# Patient Record
Sex: FEMALE | Race: ASIAN | Employment: UNEMPLOYED | URBAN - METROPOLITAN AREA
[De-identification: names, ages, dates, MRNs, and addresses within clinical notes are randomized per-mention and may not be internally consistent; named-entity substitution may affect disease eponyms.]

---

## 2021-08-20 ENCOUNTER — OFFICE VISIT (OUTPATIENT)
Dept: FAMILY MEDICINE CLINIC | Age: 26
End: 2021-08-20
Payer: COMMERCIAL

## 2021-08-20 VITALS
HEIGHT: 62 IN | TEMPERATURE: 98.7 F | SYSTOLIC BLOOD PRESSURE: 114 MMHG | BODY MASS INDEX: 37.28 KG/M2 | DIASTOLIC BLOOD PRESSURE: 87 MMHG | WEIGHT: 202.6 LBS | HEART RATE: 96 BPM | RESPIRATION RATE: 16 BRPM | OXYGEN SATURATION: 95 %

## 2021-08-20 DIAGNOSIS — E03.9 ACQUIRED HYPOTHYROIDISM: ICD-10-CM

## 2021-08-20 DIAGNOSIS — M54.9 CHRONIC BACK PAIN, UNSPECIFIED BACK LOCATION, UNSPECIFIED BACK PAIN LATERALITY: ICD-10-CM

## 2021-08-20 DIAGNOSIS — R14.0 ABDOMINAL BLOATING: ICD-10-CM

## 2021-08-20 DIAGNOSIS — F41.9 ANXIETY: ICD-10-CM

## 2021-08-20 DIAGNOSIS — Z65.8 PSYCHOSOCIAL STRESSORS: ICD-10-CM

## 2021-08-20 DIAGNOSIS — E66.9 OBESITY (BMI 30-39.9): ICD-10-CM

## 2021-08-20 DIAGNOSIS — N92.6 IRREGULAR PERIODS: ICD-10-CM

## 2021-08-20 DIAGNOSIS — F32.A DEPRESSION, UNSPECIFIED DEPRESSION TYPE: Primary | ICD-10-CM

## 2021-08-20 DIAGNOSIS — G89.29 CHRONIC BACK PAIN, UNSPECIFIED BACK LOCATION, UNSPECIFIED BACK PAIN LATERALITY: ICD-10-CM

## 2021-08-20 PROCEDURE — 99204 OFFICE O/P NEW MOD 45 MIN: CPT | Performed by: NURSE PRACTITIONER

## 2021-08-20 RX ORDER — VENLAFAXINE 37.5 MG/1
18.75 TABLET ORAL DAILY
Qty: 7 TABLET | Refills: 0 | Status: SHIPPED | OUTPATIENT
Start: 2021-08-20 | End: 2021-09-03

## 2021-08-20 RX ORDER — LEVOTHYROXINE SODIUM 112 UG/1
112 TABLET ORAL DAILY
COMMUNITY
Start: 2021-07-20 | End: 2021-08-27 | Stop reason: SDUPTHER

## 2021-08-20 RX ORDER — VENLAFAXINE 37.5 MG/1
37.5 TABLET ORAL DAILY
COMMUNITY
Start: 2021-07-21 | End: 2021-08-20 | Stop reason: SDUPTHER

## 2021-08-20 NOTE — PROGRESS NOTES
The Jose Dodd 27 y.o. female presents today for:    Chief Complaint   Patient presents with    Establish Care    Depression    Thyroid Problem     LNMP 7/11/2021, periods still irregular; menstrual periods every other month. Started oral contraceptive 2/2021 because pt had 6 months without period  Not sexually active   Family in [address] [place] [address]  Patient getting ready to file for divorce. Patient has been  since 2014; has 1 child. Patient states having some issues with depression and anxiety. Denies suicidal plan but several months ago had some suicidal ideas. Patient has been on Effexor for several months but does not like it and would like to switch to a new medication. PAP never had one    Review of Systems   Constitutional: Negative. Negative for chills, malaise/fatigue and weight loss. HENT: Negative. Eyes: Negative. Respiratory: Negative. Negative for shortness of breath and wheezing. Cardiovascular: Negative. Negative for chest pain, palpitations, leg swelling and PND. Gastrointestinal: Negative. Negative for diarrhea, heartburn and vomiting. Genitourinary: Negative. Negative for dysuria, frequency, hematuria and urgency. Musculoskeletal: Positive for back pain (chronic back pain). Negative for joint pain, myalgias and neck pain. Skin: Negative. Negative for rash. Neurological: Positive for headaches. Negative for dizziness. Endo/Heme/Allergies: Negative. Negative for environmental allergies. Does not bruise/bleed easily. Psychiatric/Behavioral: Positive for depression and suicidal ideas. Negative for hallucinations. The patient is nervous/anxious. The patient does not have insomnia.         Health Maintenance Due   Topic Date Due    COVID-19 Vaccine (1) Never done    DTaP/Tdap/Td series (1 - Tdap) Never done    PAP AKA CERVICAL CYTOLOGY  Never done        Past Medical History:   Diagnosis Date    Depression     Hypothyroid      Visit Vitals  /87 (BP 1 Location: Left upper arm, BP Patient Position: Sitting)   Pulse 96   Temp 98.7 °F (37.1 °C) (Temporal)   Resp 16   Ht 5' 2\" (1.575 m)   Wt 202 lb 9.6 oz (91.9 kg)   SpO2 95%   BMI 37.06 kg/m²           Physical Exam  Constitutional:       Appearance: She is obese. Eyes:      Pupils: Pupils are equal, round, and reactive to light. Neck:      Vascular: No carotid bruit. Cardiovascular:      Rate and Rhythm: Normal rate and regular rhythm. Pulses: Normal pulses. Heart sounds: Normal heart sounds. No murmur heard. Pulmonary:      Effort: Pulmonary effort is normal. No respiratory distress. Breath sounds: Normal breath sounds. No wheezing. Abdominal:      Palpations: Abdomen is soft. Tenderness: There is no abdominal tenderness. Musculoskeletal:         General: No swelling. Normal range of motion. Cervical back: Normal range of motion and neck supple. Right lower leg: No edema. Left lower leg: No edema. Lymphadenopathy:      Cervical: No cervical adenopathy. Skin:     General: Skin is warm. Capillary Refill: Capillary refill takes less than 2 seconds. Neurological:      General: No focal deficit present. Mental Status: She is alert and oriented to person, place, and time. Visit Vitals  /87 (BP 1 Location: Left upper arm, BP Patient Position: Sitting)   Pulse 96   Temp 98.7 °F (37.1 °C) (Temporal)   Resp 16   Ht 5' 2\" (1.575 m)   Wt 202 lb 9.6 oz (91.9 kg)   SpO2 95%   BMI 37.06 kg/m²     ASSESSMENT and PLAN    ICD-10-CM ICD-9-CM    1. Depression, unspecified depression type  F32.9 311 REFERRAL TO PSYCHIATRY      venlafaxine (EFFEXOR) 37.5 mg tablet   2. Anxiety  F41.9 300.00 REFERRAL TO PSYCHIATRY      venlafaxine (EFFEXOR) 37.5 mg tablet   3. Psychosocial stressors  Z65.8 V62.89 REFERRAL TO PSYCHIATRY   4.  Acquired hypothyroidism  E03.9 244.9 TSH 3RD GENERATION      METABOLIC PANEL, COMPREHENSIVE      T4 (THYROXINE)      T4 (THYROXINE) METABOLIC PANEL, COMPREHENSIVE      TSH 3RD GENERATION   5. Abdominal bloating  R14.0 787.3 CELIAC ANTIBODY PROFILE      CELIAC ANTIBODY PROFILE   6. Irregular periods  N92.6 626.4    7. Obesity (BMI 30-39. 9)  E66.9 278.00 LIPID PANEL      LIPID PANEL   8. Chronic back pain, unspecified back location, unspecified back pain laterality  M54.9 724.5     G89.29 338.29      Follow-up and Dispositions    · Return in about 2 weeks (around 9/3/2021) for PAP/depression management/lab review. .       the following changes in treatment are made: will taper down Effexor medication for 2 weeks and then start a new SSRI afterwards. Advised patient any SI or plan to call 911 or go to ER-patient agrees with plan. Referral to psychiatry for counseling.   lab results and schedule of future lab studies reviewed with patient  reviewed diet, exercise and weight control  reviewed medications and side effects in detail    Susy Scott NP

## 2021-08-20 NOTE — PROGRESS NOTES
Candice Houston presents today for   Chief Complaint   Patient presents with    Establish Care    Depression    Thyroid Problem       Is someone accompanying this pt? no    Is the patient using any DME equipment during OV? no    Depression Screening:  3 most recent PHQ Screens 8/20/2021   Little interest or pleasure in doing things Several days   Feeling down, depressed, irritable, or hopeless Nearly every day   Total Score PHQ 2 4   Trouble falling or staying asleep, or sleeping too much More than half the days   Feeling tired or having little energy More than half the days   Poor appetite, weight loss, or overeating Nearly every day   Feeling bad about yourself - or that you are a failure or have let yourself or your family down Several days   Trouble concentrating on things such as school, work, reading, or watching TV Several days   Moving or speaking so slowly that other people could have noticed; or the opposite being so fidgety that others notice Not at all   Thoughts of being better off dead, or hurting yourself in some way More than half the days   PHQ 9 Score 15   How difficult have these problems made it for you to do your work, take care of your home and get along with others Somewhat difficult       Learning Assessment:  Learning Assessment 8/20/2021   PRIMARY LEARNER Patient   HIGHEST LEVEL OF EDUCATION - PRIMARY LEARNER  SOME COLLEGE   BARRIERS PRIMARY LEARNER NONE   CO-LEARNER CAREGIVER No   PRIMARY LANGUAGE ENGLISH   LEARNER PREFERENCE PRIMARY DEMONSTRATION   ANSWERED BY patient   RELATIONSHIP SELF       Health Maintenance reviewed and discussed and ordered per Provider. Health Maintenance Due   Topic Date Due    Hepatitis C Screening  Never done    COVID-19 Vaccine (1) Never done    DTaP/Tdap/Td series (1 - Tdap) Never done    PAP AKA CERVICAL CYTOLOGY  Never done   . Coordination of Care:  1. Have you been to the ER, urgent care clinic since your last visit?  Hospitalized since your last visit? no    2. Have you seen or consulted any other health care providers outside of the [address] [place] [address] since your last visit? Include any pap smears or colon screening.  Yes [facility] check[facility], last year     Health Maintenance Due   Topic Date Due    Hepatitis C Screening  Never done    COVID-19 Vaccine (1) Never done    DTaP/Tdap/Td series (1 - Tdap) Never done    PAP AKA CERVICAL CYTOLOGY  Never done

## 2021-08-21 LAB
A-G RATIO,AGRAT: 1.6 RATIO (ref 1.1–2.6)
ALBUMIN SERPL-MCNC: 4.8 G/DL (ref 3.5–5)
ALP SERPL-CCNC: 49 U/L (ref 25–115)
ALT SERPL-CCNC: 92 U/L (ref 5–40)
ANION GAP SERPL CALC-SCNC: 13 MMOL/L (ref 3–15)
AST SERPL W P-5'-P-CCNC: 59 U/L (ref 10–37)
BILIRUB SERPL-MCNC: 0.4 MG/DL (ref 0.2–1.2)
BUN SERPL-MCNC: 9 MG/DL (ref 6–22)
CALCIUM SERPL-MCNC: 10.2 MG/DL (ref 8.4–10.5)
CHLORIDE SERPL-SCNC: 102 MMOL/L (ref 98–110)
CHOLEST SERPL-MCNC: 159 MG/DL (ref 110–200)
CO2 SERPL-SCNC: 25 MMOL/L (ref 20–32)
CREAT SERPL-MCNC: 0.9 MG/DL (ref 0.5–1.2)
GFRAA, 66117: >60
GFRNA, 66118: >60
GLOBULIN,GLOB: 3 G/DL (ref 2–4)
GLUCOSE SERPL-MCNC: 98 MG/DL (ref 70–99)
HDLC SERPL-MCNC: 3.7 MG/DL (ref 0–5)
HDLC SERPL-MCNC: 43 MG/DL
LDL/HDL RATIO,LDHD: 1.6
LDLC SERPL CALC-MCNC: 70 MG/DL (ref 50–99)
NON-HDL CHOLESTEROL, 011976: 116 MG/DL
POTASSIUM SERPL-SCNC: 4.9 MMOL/L (ref 3.5–5.5)
PROT SERPL-MCNC: 7.8 G/DL (ref 6.4–8.3)
SODIUM SERPL-SCNC: 140 MMOL/L (ref 133–145)
T4 SERPL-MCNC: 13.7 MCG/DL (ref 4.5–10.9)
TRIGL SERPL-MCNC: 229 MG/DL (ref 40–149)
TSH SERPL DL<=0.005 MIU/L-ACNC: 0.07 MCU/ML (ref 0.27–4.2)
VLDLC SERPL CALC-MCNC: 46 MG/DL (ref 8–30)

## 2021-08-23 LAB
GLIADIN IGA, GIGA: 1.2 U/ML
GLIADIN IGG, GIGG: <0.5 U/ML
IMMUNOGLOBULIN A, QN, SERUM, 001784: 198 MG/DL (ref 70–400)
T-TRANSGLUTAMINASE, IGA, 164643: <0.5 U/ML
TTG IGG SER-ACNC: 2 U/ML

## 2021-08-27 ENCOUNTER — TELEPHONE (OUTPATIENT)
Dept: FAMILY MEDICINE CLINIC | Age: 26
End: 2021-08-27

## 2021-08-27 DIAGNOSIS — E78.1 HYPERTRIGLYCERIDEMIA: ICD-10-CM

## 2021-08-27 DIAGNOSIS — R79.89 ELEVATED LFTS: Primary | ICD-10-CM

## 2021-08-27 DIAGNOSIS — E03.9 ACQUIRED HYPOTHYROIDISM: ICD-10-CM

## 2021-08-27 RX ORDER — LEVOTHYROXINE SODIUM 100 UG/1
100 TABLET ORAL DAILY
Qty: 30 TABLET | Refills: 0 | Status: SHIPPED | OUTPATIENT
Start: 2021-08-27 | End: 2021-09-30 | Stop reason: SDUPTHER

## 2021-08-27 RX ORDER — FENOFIBRATE 48 MG/1
48 TABLET, COATED ORAL DAILY
Qty: 90 TABLET | Refills: 1 | Status: SHIPPED | OUTPATIENT
Start: 2021-08-27 | End: 2022-02-24 | Stop reason: SDUPTHER

## 2021-08-27 NOTE — PROGRESS NOTES
Can you please call patient to let the patient know that her labs are abnormal. Patient's celiac antibody is normal. Patient's triglycerides are elevated (pt needs to limit amount of carbohydrates and sugar); I will also call in [employer] medication for this. Pt's liver enzymes are elevated and will order a hepatitis panel and ultrasound of abdomen. Also, TSH is low and we need to adjust your Synthroid dose and decrease the medication-will send to pharmacy. Thanks!

## 2021-09-08 ENCOUNTER — HOSPITAL ENCOUNTER (OUTPATIENT)
Dept: ULTRASOUND IMAGING | Age: 26
Discharge: HOME | End: 2021-09-08
Attending: NURSE PRACTITIONER
Payer: COMMERCIAL

## 2021-09-08 DIAGNOSIS — R79.89 ELEVATED LFTS: ICD-10-CM

## 2021-09-08 PROCEDURE — 76705 ECHO EXAM OF ABDOMEN: CPT

## 2021-09-09 ENCOUNTER — OFFICE VISIT (OUTPATIENT)
Dept: FAMILY MEDICINE CLINIC | Age: 26
End: 2021-09-09
Payer: COMMERCIAL

## 2021-09-09 VITALS
DIASTOLIC BLOOD PRESSURE: 71 MMHG | RESPIRATION RATE: 16 BRPM | OXYGEN SATURATION: 97 % | HEART RATE: 70 BPM | TEMPERATURE: 98.7 F | WEIGHT: 200 LBS | SYSTOLIC BLOOD PRESSURE: 112 MMHG | BODY MASS INDEX: 36.8 KG/M2 | HEIGHT: 62 IN

## 2021-09-09 DIAGNOSIS — B37.9 YEAST INFECTION: ICD-10-CM

## 2021-09-09 DIAGNOSIS — F32.A DEPRESSION, UNSPECIFIED DEPRESSION TYPE: ICD-10-CM

## 2021-09-09 DIAGNOSIS — N93.9 ABNORMAL VAGINAL BLEEDING: ICD-10-CM

## 2021-09-09 DIAGNOSIS — Z11.3 ROUTINE SCREENING FOR STI (SEXUALLY TRANSMITTED INFECTION): ICD-10-CM

## 2021-09-09 DIAGNOSIS — Z12.4 PAP SMEAR FOR CERVICAL CANCER SCREENING: ICD-10-CM

## 2021-09-09 DIAGNOSIS — N88.9 CERVICAL LESION: ICD-10-CM

## 2021-09-09 DIAGNOSIS — Z12.39 ENCOUNTER FOR BREAST CANCER SCREENING USING NON-MAMMOGRAM MODALITY: ICD-10-CM

## 2021-09-09 DIAGNOSIS — Z12.4 SCREENING FOR MALIGNANT NEOPLASM OF CERVIX: Primary | ICD-10-CM

## 2021-09-09 PROCEDURE — 99214 OFFICE O/P EST MOD 30 MIN: CPT | Performed by: NURSE PRACTITIONER

## 2021-09-09 RX ORDER — ESCITALOPRAM OXALATE 10 MG/1
5 TABLET ORAL DAILY
Qty: 30 TABLET | Refills: 0 | Status: SHIPPED | OUTPATIENT
Start: 2021-09-09 | End: 2021-10-21 | Stop reason: SDUPTHER

## 2021-09-09 RX ORDER — LEVONORGESTREL AND ETHINYL ESTRADIOL 0.15-0.03
1 KIT ORAL DAILY
COMMUNITY
End: 2021-10-21 | Stop reason: SDUPTHER

## 2021-09-09 RX ORDER — FLUCONAZOLE 150 MG/1
150 TABLET ORAL DAILY
Qty: 1 TABLET | Refills: 0 | Status: SHIPPED | OUTPATIENT
Start: 2021-09-09 | End: 2021-09-09 | Stop reason: SINTOL

## 2021-09-09 RX ORDER — MICONAZOLE NITRATE 2 %
1 CREAM WITH APPLICATOR VAGINAL
Qty: 25 G | Refills: 0 | Status: SHIPPED | OUTPATIENT
Start: 2021-09-09 | End: 2021-09-13 | Stop reason: SDUPTHER

## 2021-09-09 NOTE — PROGRESS NOTES
Please let patient know that his ultrasound of abdomen shows a fatty liver. Patient needs to eat low fat diet.

## 2021-09-09 NOTE — PROGRESS NOTES
Patient states she will wait on the Flu vaccine. Leland Leslie presents today for   Chief Complaint   Patient presents with    Well Woman    Labs       Is someone accompanying this pt? no    Is the patient using any DME equipment during OV? no    Depression Screening:  3 most recent PHQ Screens 9/9/2021   Little interest or pleasure in doing things Several days   Feeling down, depressed, irritable, or hopeless -   Total Score PHQ 2 -   Trouble falling or staying asleep, or sleeping too much -   Feeling tired or having little energy -   Poor appetite, weight loss, or overeating -   Feeling bad about yourself - or that you are a failure or have let yourself or your family down -   Trouble concentrating on things such as school, work, reading, or watching TV -   Moving or speaking so slowly that other people could have noticed; or the opposite being so fidgety that others notice -   Thoughts of being better off dead, or hurting yourself in some way -   PHQ 9 Score -   How difficult have these problems made it for you to do your work, take care of your home and get along with others -       Learning Assessment:  Learning Assessment 8/20/2021   PRIMARY LEARNER Patient   HIGHEST LEVEL OF EDUCATION - PRIMARY LEARNER  SOME COLLEGE   BARRIERS PRIMARY LEARNER NONE   CO-LEARNER CAREGIVER No   PRIMARY LANGUAGE ENGLISH   LEARNER PREFERENCE PRIMARY DEMONSTRATION   ANSWERED BY patient   RELATIONSHIP SELF       Health Maintenance reviewed and discussed and ordered per Provider. Health Maintenance Due   Topic Date Due    DTaP/Tdap/Td series (1 - Tdap) Never done    Pap Smear  Never done    Flu Vaccine (1) Never done   . Coordination of Care:  1. Have you been to the ER, urgent care clinic since your last visit? Hospitalized since your last visit? no    2. Have you seen or consulted any other health care providers outside of the [address] [place] [address] since your last visit?  Include any pap smears or colon screening.  No    Health Maintenance Due   Topic Date Due    DTaP/Tdap/Td series (1 - Tdap) Never done    Pap Smear  Never done    Flu Vaccine (1) Never done

## 2021-09-09 NOTE — PROGRESS NOTES
The Jose Dodd 27 y.o. female presents today for:    Chief Complaint   Patient presents with    Well Woman    Labs     STD testing-patient would like to be tested  1 sexual partner in 12 months   Pt has vaginal discharge that smells like yeast for a week  G 1 P 1 A 0 L 1  No family history of GYN or breast CA  Never had a PAP   LNMP 9/1-9/6  Irregular periods   Any pain with sex  Any  symptoms   2 weeks of effexor taper      Review of Systems   Constitutional: Negative. Negative for chills, malaise/fatigue and weight loss. HENT: Negative. Eyes: Negative. Respiratory: Negative. Negative for shortness of breath and wheezing. Cardiovascular: Negative. Negative for chest pain, palpitations, leg swelling and PND. Gastrointestinal: Negative. Negative for diarrhea, heartburn and vomiting. Genitourinary: Negative. Negative for dysuria, frequency, hematuria and urgency. Musculoskeletal: Positive for back pain (chronic back pain). Negative for joint pain, myalgias and neck pain. Skin: Negative. Negative for rash. Neurological: Positive for headaches. Negative for dizziness. Endo/Heme/Allergies: Negative. Negative for environmental allergies. Does not bruise/bleed easily. Psychiatric/Behavioral: Positive for depression and suicidal ideas. Negative for hallucinations. The patient is nervous/anxious. The patient does not have insomnia. Health Maintenance Due   Topic Date Due    DTaP/Tdap/Td series (1 - Tdap) Never done    Pap Smear  Never done    Flu Vaccine (1) Never done        Past Medical History:   Diagnosis Date    Depression     Hypothyroid        Physical Exam  Exam conducted with a chaperone present. Constitutional:       Appearance: She is obese. Eyes:      Pupils: Pupils are equal, round, and reactive to light. Neck:      Vascular: No carotid bruit. Cardiovascular:      Rate and Rhythm: Normal rate and regular rhythm. Pulses: Normal pulses.       Heart sounds: Normal heart sounds. No murmur heard. Pulmonary:      Effort: Pulmonary effort is normal. No respiratory distress. Breath sounds: Normal breath sounds. No wheezing. Chest:      Breasts:         Right: Normal.         Left: Normal.   Abdominal:      Palpations: Abdomen is soft. Tenderness: There is no abdominal tenderness. Genitourinary:     General: Normal vulva. Exam position: Supine. Pubic Area: No rash or pubic lice. Labia:         Right: No rash or tenderness. Left: No rash or tenderness. Vagina: No foreign body. Vaginal discharge present. No erythema, tenderness, bleeding or lesions. Cervix: Cervical motion tenderness, friability, lesion, erythema and cervical bleeding present. Uterus: Normal.       Adnexa: Right adnexa normal.      Rectum: Normal.         Musculoskeletal:         General: No swelling. Normal range of motion. Cervical back: Normal range of motion and neck supple. Right lower leg: No edema. Left lower leg: No edema. Lymphadenopathy:      Cervical: No cervical adenopathy. Lower Body: No right inguinal adenopathy. No left inguinal adenopathy. Skin:     General: Skin is warm. Capillary Refill: Capillary refill takes less than 2 seconds. Neurological:      General: No focal deficit present. Mental Status: She is alert and oriented to person, place, and time. Visit Vitals  /71 (BP 1 Location: Right arm, BP Patient Position: Sitting)   Pulse 70   Temp 98.7 °F (37.1 °C) (Temporal)   Resp 16   Ht 5' 2\" (1.575 m)   Wt 200 lb (90.7 kg)   SpO2 97%   BMI 36.58 kg/m²         ASSESSMENT and PLAN    ICD-10-CM ICD-9-CM    1. Screening for malignant neoplasm of cervix  Z12.4 V76.2 PAP IG, APTIMA HPV AND RFX 16/18,45 ([id number])      PAP IG, APTIMA HPV AND RFX 16/18,45 ([id number])   2. Encounter for breast cancer screening using non-mammogram modality  Z12.39 V76.10    3.  Pap smear for cervical cancer screening  Z12.4 V76.2    4. Yeast infection  B37.9 112.9 DISCONTINUED: fluconazole (DIFLUCAN) 150 mg tablet      DISCONTINUED: miconazole (MICOTIN) 2 % vaginal cream   5. Routine screening for STI (sexually transmitted infection)  Z11.3 V74.5 NUSWAB VG PLUS+MYCOPLASMAS,HEMAL      NUSWAB VG PLUS+MYCOPLASMAS,HEMAL   6. Abnormal vaginal bleeding  N93.9 623.8 REFERRAL TO OBSTETRICS AND GYNECOLOGY   7. Cervical lesion  N88.9 622.9 REFERRAL TO OBSTETRICS AND GYNECOLOGY   8. Depression, unspecified depression type  F32.9 311 escitalopram oxalate (LEXAPRO) 10 mg tablet     Follow-up and Dispositions    · Return in about 1 month (around 10/9/2021), or if symptoms worsen or fail to improve. Finished antidepressant taper, Lexapro ordered.  OB/GYN referral.  Yvrose Quinteros NP

## 2021-09-13 DIAGNOSIS — B37.9 YEAST INFECTION: ICD-10-CM

## 2021-09-13 LAB
BACTERIAL VAGINOSIS, NAA: POSITIVE
CANDIDA GLABRATA, NAA, 180057: NEGATIVE
CANDIDA SPECIES, NAA: NEGATIVE
CHLAMYDIA TRACHOMATIS, NAA, 180097: NEGATIVE
HPV HIGH RISK, 507303: POSITIVE
NEISSERIA GONORRHOEAE, NAA, 180104: NEGATIVE
TRICH VAG BY NAA, 180087: NEGATIVE

## 2021-09-13 RX ORDER — MICONAZOLE NITRATE 2 %
1 CREAM WITH APPLICATOR VAGINAL
Qty: 25 G | Refills: 0 | Status: SHIPPED | OUTPATIENT
Start: 2021-09-13 | End: 2021-09-18

## 2021-09-14 ENCOUNTER — TELEPHONE (OUTPATIENT)
Dept: FAMILY MEDICINE CLINIC | Age: 26
End: 2021-09-14

## 2021-09-14 DIAGNOSIS — B96.89 BACTERIAL VAGINOSIS: Primary | ICD-10-CM

## 2021-09-14 DIAGNOSIS — N76.0 BACTERIAL VAGINOSIS: Primary | ICD-10-CM

## 2021-09-14 RX ORDER — METRONIDAZOLE 500 MG/1
500 TABLET ORAL 2 TIMES DAILY
Qty: 20 TABLET | Refills: 0 | Status: SHIPPED | OUTPATIENT
Start: 2021-09-14 | End: 2021-09-24

## 2021-09-14 NOTE — PROGRESS NOTES
Pt's VG plus positive for bacterial vaginosis, flagyl Rx sent to pharmacy. Pt already referred to OB/GYN at last visit for cervical lesion and now PAP is positive for HPV. Pt requires further testing as this is her first PAP.

## 2021-09-15 LAB — PAP IMAGE GUIDED, 8900296: NORMAL

## 2021-09-16 LAB
MISCELLANEOUS TEST,99000: NORMAL
MYCOPLASMA GENITALIUM, SWAB: NEGATIVE
SENT TO, 434: NORMAL
TEST NAME, 435: NORMAL

## 2021-09-30 DIAGNOSIS — E03.9 ACQUIRED HYPOTHYROIDISM: ICD-10-CM

## 2021-09-30 RX ORDER — LEVOTHYROXINE SODIUM 100 UG/1
100 TABLET ORAL DAILY
Qty: 30 TABLET | Refills: 0 | Status: SHIPPED | OUTPATIENT
Start: 2021-09-30 | End: 2021-10-21 | Stop reason: SDUPTHER

## 2021-10-21 ENCOUNTER — OFFICE VISIT (OUTPATIENT)
Dept: FAMILY MEDICINE CLINIC | Age: 26
End: 2021-10-21
Payer: COMMERCIAL

## 2021-10-21 VITALS
HEIGHT: 62 IN | WEIGHT: 191.2 LBS | HEART RATE: 78 BPM | SYSTOLIC BLOOD PRESSURE: 104 MMHG | DIASTOLIC BLOOD PRESSURE: 71 MMHG | BODY MASS INDEX: 35.19 KG/M2 | OXYGEN SATURATION: 98 % | RESPIRATION RATE: 16 BRPM | TEMPERATURE: 98.3 F

## 2021-10-21 DIAGNOSIS — Z63.5 FAMILY DISRUPTION DUE TO DIVORCE OR LEGAL SEPARATION: ICD-10-CM

## 2021-10-21 DIAGNOSIS — E03.9 ACQUIRED HYPOTHYROIDISM: ICD-10-CM

## 2021-10-21 DIAGNOSIS — F32.A DEPRESSION, UNSPECIFIED DEPRESSION TYPE: ICD-10-CM

## 2021-10-21 DIAGNOSIS — N93.9 ABNORMAL VAGINAL BLEEDING: Primary | ICD-10-CM

## 2021-10-21 DIAGNOSIS — F41.9 ANXIETY: ICD-10-CM

## 2021-10-21 PROCEDURE — 99214 OFFICE O/P EST MOD 30 MIN: CPT | Performed by: NURSE PRACTITIONER

## 2021-10-21 RX ORDER — LEVONORGESTREL AND ETHINYL ESTRADIOL 0.15-0.03
1 KIT ORAL DAILY
Qty: 1 DOSE PACK | Refills: 3 | Status: SHIPPED | OUTPATIENT
Start: 2021-10-21 | End: 2022-01-18 | Stop reason: SDUPTHER

## 2021-10-21 RX ORDER — LEVOTHYROXINE SODIUM 100 UG/1
100 TABLET ORAL DAILY
Qty: 30 TABLET | Refills: 0 | Status: SHIPPED | OUTPATIENT
Start: 2021-10-21 | End: 2021-10-21 | Stop reason: ALTCHOICE

## 2021-10-21 RX ORDER — ESCITALOPRAM OXALATE 10 MG/1
10 TABLET ORAL DAILY
Qty: 30 TABLET | Refills: 2 | Status: SHIPPED | OUTPATIENT
Start: 2021-10-21 | End: 2021-11-03 | Stop reason: SDUPTHER

## 2021-10-21 SDOH — SOCIAL STABILITY - SOCIAL INSECURITY: DISRUPTION OF FAMILY BY SEPARATION AND DIVORCE: Z63.5

## 2021-10-21 NOTE — PROGRESS NOTES
The Jose Dodd 27 y.o. female presents today for:    Chief Complaint   Patient presents with    Follow-up    Depression   [address] NP present with pt's permission. LNMP 10/11/2021    Patient did not tolerate the Effexor and tapered off. Denies SI/HI. The past 2 weeks have been better without medication. Still with family/separation stress. Denies increase in headaches. Review of Systems   Constitutional: Negative. Negative for chills, malaise/fatigue and weight loss. HENT: Negative. Eyes: Negative. Respiratory: Negative. Negative for shortness of breath and wheezing. Cardiovascular: Negative. Negative for chest pain, palpitations, leg swelling and PND. Gastrointestinal: Negative. Negative for diarrhea, heartburn and vomiting. Genitourinary: Negative. Negative for dysuria, frequency, hematuria and urgency. Musculoskeletal: Positive for back pain (chronic back pain). Negative for joint pain, myalgias and neck pain. Skin: Negative. Negative for rash. Neurological: Negative for dizziness and headaches. Endo/Heme/Allergies: Negative. Negative for environmental allergies. Does not bruise/bleed easily. Psychiatric/Behavioral: Positive for depression. Negative for hallucinations and suicidal ideas. The patient is nervous/anxious. The patient does not have insomnia. Health Maintenance Due   Topic Date Due    DTaP/Tdap/Td series (1 - Tdap) Never done    Flu Vaccine (1) Never done        Past Medical History:   Diagnosis Date    Depression     Hypothyroid        Physical Exam  Constitutional:       Appearance: She is obese. Eyes:      Pupils: Pupils are equal, round, and reactive to light. Neck:      Vascular: No carotid bruit. Cardiovascular:      Rate and Rhythm: Normal rate and regular rhythm. Pulses: Normal pulses. Heart sounds: Normal heart sounds. No murmur heard.      Pulmonary:      Effort: Pulmonary effort is normal. No respiratory distress. Breath sounds: Normal breath sounds. No wheezing. Chest:      Breasts:         Right: Normal.         Left: Normal.   Abdominal:      Palpations: Abdomen is soft. Tenderness: There is no abdominal tenderness. Genitourinary:        Musculoskeletal:         General: No swelling. Normal range of motion. Cervical back: Normal range of motion and neck supple. Right lower leg: No edema. Left lower leg: No edema. Lymphadenopathy:      Cervical: No cervical adenopathy. Skin:     General: Skin is warm. Capillary Refill: Capillary refill takes less than 2 seconds. Neurological:      General: No focal deficit present. Mental Status: She is alert and oriented to person, place, and time. Visit Vitals  /71 (BP 1 Location: Left upper arm, BP Patient Position: Sitting)   Pulse 78   Temp 98.3 °F (36.8 °C) (Temporal)   Resp 16   Ht 5' 2\" (1.575 m)   Wt 191 lb 3.2 oz (86.7 kg)   SpO2 98%   BMI 34.97 kg/m²          ASSESSMENT and PLAN    ICD-10-CM ICD-9-CM    1. Abnormal vaginal bleeding  N93.9 623.8 levonorgestrel-ethinyl estradiol (NORDETTE) 0.15-0.03 mg tab   2. Acquired hypothyroidism  E03.9 244.9 TSH 3RD GENERATION      DISCONTINUED: levothyroxine (SYNTHROID) 100 mcg tablet   3. Depression, unspecified depression type  F32. A 311 escitalopram oxalate (LEXAPRO) 10 mg tablet      REFERRAL TO PSYCHIATRY   4. Anxiety  F41.9 300.00 REFERRAL TO PSYCHIATRY   5.  Family disruption due to divorce or legal separation  Z63.5 V61.03 REFERRAL TO PSYCHIATRY         lab results and schedule of future lab studies reviewed with patient  reviewed diet, exercise and weight control  cardiovascular risk and specific lipid/LDL goals reviewed  reviewed medications and side effects in detail    Guru Perez NP

## 2021-10-21 NOTE — PROGRESS NOTES
Patient went to see the OB/GYN on October 12, 2021. Patient declined Flu vaccine declined. Moses Piper presents today for   Chief Complaint   Patient presents with    Follow-up    Depression    Labs       Is someone accompanying this pt? no    Is the patient using any DME equipment during OV? no    Depression Screening:  3 most recent PHQ Screens 10/21/2021   Little interest or pleasure in doing things Nearly every day   Feeling down, depressed, irritable, or hopeless Nearly every day   Total Score PHQ 2 6   Trouble falling or staying asleep, or sleeping too much Not at all   Feeling tired or having little energy More than half the days   Poor appetite, weight loss, or overeating More than half the days   Feeling bad about yourself - or that you are a failure or have let yourself or your family down More than half the days   Trouble concentrating on things such as school, work, reading, or watching TV Several days   Moving or speaking so slowly that other people could have noticed; or the opposite being so fidgety that others notice Not at all   Thoughts of being better off dead, or hurting yourself in some way Not at all   PHQ 9 Score 13   How difficult have these problems made it for you to do your work, take care of your home and get along with others Somewhat difficult       Learning Assessment:  Learning Assessment 8/20/2021   PRIMARY LEARNER Patient   HIGHEST LEVEL OF EDUCATION - PRIMARY LEARNER  SOME COLLEGE   BARRIERS PRIMARY LEARNER NONE   CO-LEARNER CAREGIVER No   PRIMARY LANGUAGE ENGLISH   LEARNER PREFERENCE PRIMARY DEMONSTRATION   ANSWERED BY patient   RELATIONSHIP SELF       Health Maintenance reviewed and discussed and ordered per Provider. Health Maintenance Due   Topic Date Due    Flu Vaccine (1) Never done   . Coordination of Care:  1. Have you been to the ER, urgent care clinic since your last visit? Hospitalized since your last visit? no    2.  Have you seen or consulted any other health care providers outside of the [address] [place] [address] since your last visit? Include any pap smears or colon screening.  No    Health Maintenance Due   Topic Date Due    Flu Vaccine (1) Never done

## 2021-11-03 DIAGNOSIS — F32.A DEPRESSION, UNSPECIFIED DEPRESSION TYPE: ICD-10-CM

## 2021-11-03 RX ORDER — ESCITALOPRAM OXALATE 10 MG/1
10 TABLET ORAL DAILY
Qty: 30 TABLET | Refills: 2 | Status: SHIPPED | OUTPATIENT
Start: 2021-11-03 | End: 2022-01-18 | Stop reason: SDUPTHER

## 2021-11-03 NOTE — TELEPHONE ENCOUNTER
Fax received from pharmacy states patient takes a half of a tablet daily. EMR say 1 tablet daily and requesting 90 day supply.

## 2021-12-02 ENCOUNTER — LAB ONLY (OUTPATIENT)
Dept: FAMILY MEDICINE CLINIC | Age: 26
End: 2021-12-02

## 2021-12-02 ENCOUNTER — TELEPHONE (OUTPATIENT)
Dept: FAMILY MEDICINE CLINIC | Age: 26
End: 2021-12-02

## 2021-12-02 DIAGNOSIS — E03.9 ACQUIRED HYPOTHYROIDISM: Primary | ICD-10-CM

## 2021-12-02 DIAGNOSIS — E03.9 ACQUIRED HYPOTHYROIDISM: ICD-10-CM

## 2021-12-02 DIAGNOSIS — R79.89 ELEVATED LFTS: ICD-10-CM

## 2021-12-03 LAB
HCV AB SER IA-ACNC: NORMAL
HEP A AB,IGM, 006734: NORMAL
HEP B CORE AB,IGM, 016881: NORMAL
HEP B SURFACE AG SCRN, 006510: NORMAL
TSH SERPL DL<=0.005 MIU/L-ACNC: 2.06 MCU/ML (ref 0.27–4.2)

## 2021-12-27 PROBLEM — F32.A DEPRESSIVE DISORDER: Status: ACTIVE | Noted: 2021-10-12

## 2021-12-27 PROBLEM — F41.9 ANXIETY: Status: ACTIVE | Noted: 2021-10-12

## 2021-12-27 PROBLEM — E03.9 HYPOTHYROIDISM: Status: ACTIVE | Noted: 2021-10-12

## 2021-12-27 PROBLEM — E78.00 HYPERCHOLESTEROLEMIA: Status: ACTIVE | Noted: 2021-10-12

## 2021-12-27 RX ORDER — LEVOTHYROXINE SODIUM 100 UG/1
100 TABLET ORAL
Qty: 30 TABLET | Refills: 0 | Status: SHIPPED | OUTPATIENT
Start: 2021-12-27 | End: 2022-01-18 | Stop reason: SDUPTHER

## 2021-12-27 NOTE — TELEPHONE ENCOUNTER
Pt has been off three weeks synthroid due to miscommunication. This NP reiterated to take medication for 30 days and schedule 1 month TSH lab. Pt agrees with plan and will  medication today. Pt complains of fatigue and hair loss since stopping.

## 2021-12-27 NOTE — TELEPHONE ENCOUNTER
Spoke to patient to inform her about her medication. Patient states she would like to go to a Endocrinologist to see what is going on and also she has finished her Synothroid medication. Patient was inform that a message will be sent to ROHITH Worrell and I will give her a call back if not today then tomorrow about what she will need to do concerning medication. Patient verbalized understanding.

## 2021-12-27 NOTE — TELEPHONE ENCOUNTER
Patient states she was informed to stop her synthroid medication. Result note was sent to [facility] who called patient to let her know levels were good. Pt has not been taking her levothyroxine; unsure for how long. This NP called and left voicemail for patient to call back regarding medication as pt should never stop taking synthroid medication.

## 2022-01-18 DIAGNOSIS — N93.9 ABNORMAL VAGINAL BLEEDING: ICD-10-CM

## 2022-01-18 DIAGNOSIS — F32.A DEPRESSION, UNSPECIFIED DEPRESSION TYPE: ICD-10-CM

## 2022-01-18 RX ORDER — LEVONORGESTREL AND ETHINYL ESTRADIOL 0.15-0.03
1 KIT ORAL DAILY
Qty: 1 DOSE PACK | Refills: 3 | Status: SHIPPED | OUTPATIENT
Start: 2022-01-18 | End: 2022-04-11 | Stop reason: SDUPTHER

## 2022-01-18 RX ORDER — ESCITALOPRAM OXALATE 10 MG/1
10 TABLET ORAL DAILY
Qty: 30 TABLET | Refills: 2 | Status: SHIPPED | OUTPATIENT
Start: 2022-01-18 | End: 2022-02-24 | Stop reason: SDUPTHER

## 2022-01-18 RX ORDER — LEVOTHYROXINE SODIUM 100 UG/1
100 TABLET ORAL
Qty: 30 TABLET | Refills: 1 | Status: SHIPPED | OUTPATIENT
Start: 2022-01-18 | End: 2022-02-14 | Stop reason: SDUPTHER

## 2022-01-19 ENCOUNTER — LAB ONLY (OUTPATIENT)
Dept: FAMILY MEDICINE CLINIC | Age: 27
End: 2022-01-19

## 2022-01-19 DIAGNOSIS — E03.9 ACQUIRED HYPOTHYROIDISM: ICD-10-CM

## 2022-01-20 LAB — TSH SERPL DL<=0.005 MIU/L-ACNC: 2.24 MCU/ML (ref 0.27–4.2)

## 2022-01-21 NOTE — PROGRESS NOTES
Can you please call patient to let the patient know that her TSH is normal so continue same dose of Synthroid. Thanks!

## 2022-01-28 NOTE — PROGRESS NOTES
1st attempt to call patient to inform of labs, no answer; left voicemail for patient to call office back.

## 2022-02-14 RX ORDER — LEVOTHYROXINE SODIUM 100 UG/1
100 TABLET ORAL
Qty: 30 TABLET | Refills: 1 | Status: SHIPPED | OUTPATIENT
Start: 2022-02-14 | End: 2022-02-24 | Stop reason: SDUPTHER

## 2022-02-24 ENCOUNTER — OFFICE VISIT (OUTPATIENT)
Dept: FAMILY MEDICINE CLINIC | Age: 27
End: 2022-02-24
Payer: COMMERCIAL

## 2022-02-24 VITALS
WEIGHT: 199.4 LBS | HEART RATE: 91 BPM | HEIGHT: 62 IN | DIASTOLIC BLOOD PRESSURE: 68 MMHG | TEMPERATURE: 98.4 F | RESPIRATION RATE: 16 BRPM | BODY MASS INDEX: 36.7 KG/M2 | SYSTOLIC BLOOD PRESSURE: 107 MMHG | OXYGEN SATURATION: 99 %

## 2022-02-24 DIAGNOSIS — Z23 ENCOUNTER FOR IMMUNIZATION: Primary | ICD-10-CM

## 2022-02-24 DIAGNOSIS — F32.A DEPRESSION, UNSPECIFIED DEPRESSION TYPE: ICD-10-CM

## 2022-02-24 DIAGNOSIS — E03.9 ACQUIRED HYPOTHYROIDISM: ICD-10-CM

## 2022-02-24 DIAGNOSIS — R79.89 ELEVATED LFTS: ICD-10-CM

## 2022-02-24 DIAGNOSIS — L91.8 MULTIPLE ACQUIRED SKIN TAGS: ICD-10-CM

## 2022-02-24 DIAGNOSIS — E78.1 HYPERTRIGLYCERIDEMIA: ICD-10-CM

## 2022-02-24 PROCEDURE — 90471 IMMUNIZATION ADMIN: CPT | Performed by: NURSE PRACTITIONER

## 2022-02-24 PROCEDURE — 90686 IIV4 VACC NO PRSV 0.5 ML IM: CPT | Performed by: NURSE PRACTITIONER

## 2022-02-24 PROCEDURE — 99214 OFFICE O/P EST MOD 30 MIN: CPT | Performed by: NURSE PRACTITIONER

## 2022-02-24 RX ORDER — LEVOTHYROXINE SODIUM 100 UG/1
100 TABLET ORAL
Qty: 90 TABLET | Refills: 1 | Status: SHIPPED | OUTPATIENT
Start: 2022-02-24 | End: 2022-09-21 | Stop reason: SDUPTHER

## 2022-02-24 RX ORDER — ESCITALOPRAM OXALATE 10 MG/1
10 TABLET ORAL DAILY
Qty: 90 TABLET | Refills: 1 | Status: SHIPPED | OUTPATIENT
Start: 2022-02-24 | End: 2022-09-21 | Stop reason: SDUPTHER

## 2022-02-24 RX ORDER — FENOFIBRATE 48 MG/1
48 TABLET, COATED ORAL DAILY
Qty: 90 TABLET | Refills: 1 | Status: SHIPPED | OUTPATIENT
Start: 2022-02-24 | End: 2022-08-11 | Stop reason: SDUPTHER

## 2022-02-24 NOTE — PROGRESS NOTES
The Jose Dodd 27 y.o. female presents today for:    Chief Complaint   Patient presents with    Follow-up    Thyroid Problem    Skin Problem     tags     TSH normal     Patient requesting skin tag removal. Pt has several to upper chest.      Review of Systems   Constitutional: Negative. Negative for chills, malaise/fatigue and weight loss. HENT: Negative. Eyes: Negative. Respiratory: Negative. Negative for shortness of breath and wheezing. Cardiovascular: Negative. Negative for chest pain, palpitations, leg swelling and PND. Gastrointestinal: Negative. Negative for diarrhea, heartburn and vomiting. Genitourinary: Negative. Negative for dysuria, frequency, hematuria and urgency. Musculoskeletal: Positive for back pain (chronic back pain). Negative for joint pain, myalgias and neck pain. Skin: Negative. Negative for rash. Neurological: Negative for dizziness and headaches. Endo/Heme/Allergies: Negative. Negative for environmental allergies. Does not bruise/bleed easily. Psychiatric/Behavioral: Negative for depression, hallucinations and suicidal ideas. The patient is nervous/anxious. The patient does not have insomnia. Health Maintenance Due   Topic Date Due    DTaP/Tdap/Td series (1 - Tdap) Never done    COVID-19 Vaccine (3 - Booster for Moderna series) 10/15/2021        Past Medical History:   Diagnosis Date    Depression     Hypothyroid        Physical Exam  Constitutional:       General: She is not in acute distress. Appearance: She is obese. She is not toxic-appearing. Neck:      Vascular: No carotid bruit. Cardiovascular:      Rate and Rhythm: Normal rate and regular rhythm. Pulses: Normal pulses. Heart sounds: No murmur heard. Pulmonary:      Effort: No respiratory distress. Breath sounds: Normal breath sounds. No wheezing. Musculoskeletal:      Cervical back: Normal range of motion.    Lymphadenopathy:      Cervical: No cervical adenopathy. Skin:     General: Skin is warm. Capillary Refill: Capillary refill takes less than 2 seconds. Neurological:      General: No focal deficit present. Mental Status: She is alert and oriented to person, place, and time. Psychiatric:         Thought Content: Thought content does not include homicidal or suicidal ideation. Thought content does not include homicidal or suicidal plan. Visit Vitals  /68 (BP 1 Location: Left upper arm, BP Patient Position: Sitting)   Pulse 91   Temp 98.4 °F (36.9 °C) (Temporal)   Resp 16   Ht 5' 2\" (1.575 m)   Wt 199 lb 6.4 oz (90.4 kg)   SpO2 99%   BMI 36.47 kg/m²       Current Outpatient Medications:     fenofibrate nanocrystallized (TRICOR) 48 mg tablet, Take 1 Tablet by mouth daily. Indications: high amount of triglyceride in the blood, Disp: 90 Tablet, Rfl: 1    levothyroxine (SYNTHROID) 100 mcg tablet, Take 1 Tablet by mouth Daily (before breakfast). , Disp: 90 Tablet, Rfl: 1    escitalopram oxalate (LEXAPRO) 10 mg tablet, Take 1 Tablet by mouth daily. , Disp: 90 Tablet, Rfl: 1    levonorgestrel-ethinyl estradiol (NORDETTE) 0.15-0.03 mg tab, Take 1 Tablet by mouth daily. , Disp: 1 Dose Pack, Rfl: 3    ASSESSMENT and PLAN    ICD-10-CM ICD-9-CM    1. Encounter for immunization  Z23 V03.89 INFLUENZA VIRUS VAC QUAD,SPLIT,PRESV FREE SYRINGE IM      THER/PROPH/DIAG INJECTION, SUBCUT/IM   2. Hypertriglyceridemia  E78.1 272.1 fenofibrate nanocrystallized (TRICOR) 48 mg tablet      LIPID PANEL   3. Depression, unspecified depression type  F32. A 311 escitalopram oxalate (LEXAPRO) 10 mg tablet   4. Acquired hypothyroidism  E03.9 244.9 levothyroxine (SYNTHROID) 100 mcg tablet   5. Multiple acquired skin tags  L91.8 701.9 REFERRAL TO DERMATOLOGY   6. Elevated LFTs  T31.68 855.6 METABOLIC PANEL, COMPREHENSIVE     Follow-up and Dispositions    · Return in about 3 months (around 5/24/2022).        lab results and schedule of future lab studies reviewed with patient  reviewed diet, exercise and weight control  cardiovascular risk and specific lipid/LDL goals reviewed  reviewed medications and side effects in detail    Jesus Nieves NP

## 2022-02-24 NOTE — PROGRESS NOTES
Patient do not have the covid booster. Zackary Cheema presents today for   Chief Complaint   Patient presents with    Follow-up    Thyroid Problem    Skin Problem     tags       Is someone accompanying this pt? no    Is the patient using any DME equipment during OV? no    Depression Screening:  3 most recent PHQ Screens 2/24/2022   Little interest or pleasure in doing things Several days   Feeling down, depressed, irritable, or hopeless Several days   Total Score PHQ 2 2   Trouble falling or staying asleep, or sleeping too much -   Feeling tired or having little energy -   Poor appetite, weight loss, or overeating -   Feeling bad about yourself - or that you are a failure or have let yourself or your family down -   Trouble concentrating on things such as school, work, reading, or watching TV -   Moving or speaking so slowly that other people could have noticed; or the opposite being so fidgety that others notice -   Thoughts of being better off dead, or hurting yourself in some way -   PHQ 9 Score -   How difficult have these problems made it for you to do your work, take care of your home and get along with others -       Learning Assessment:  Learning Assessment 8/20/2021   PRIMARY LEARNER Patient   HIGHEST LEVEL OF EDUCATION - PRIMARY LEARNER  SOME COLLEGE   BARRIERS PRIMARY LEARNER NONE   CO-LEARNER CAREGIVER No   PRIMARY LANGUAGE ENGLISH   LEARNER PREFERENCE PRIMARY DEMONSTRATION   ANSWERED BY patient   RELATIONSHIP SELF       Health Maintenance reviewed and discussed and ordered per Provider. Health Maintenance Due   Topic Date Due    DTaP/Tdap/Td series (1 - Tdap) Never done    Flu Vaccine (1) Never done    COVID-19 Vaccine (3 - Booster for Moderna series) 10/15/2021   . Coordination of Care:  1. Have you been to the ER, urgent care clinic since your last visit? Hospitalized since your last visit? no    2.  Have you seen or consulted any other health care providers outside of the [facility] since your last visit? Include any pap smears or colon screening. No        3. For patients aged 39-70: Has the patient had a colonoscopy / FIT/ Cologuard? NA - based on age      If the patient is female:    4. For patients aged 41-77: Has the patient had a mammogram within the past 2 years? NA - based on age or sex      11. For patients aged 21-65: Has the patient had a pap smear? Yes - no Care Gap present      Patient was given VIS for review, consent was obtained and per orders of NP. Leila Santoro , injection of Flu vaccine  given by [facility] CMA. Patient observed. No signs nor symptoms of any adverse reactions. Patient tolerated injection well.

## 2022-03-18 PROBLEM — E03.9 HYPOTHYROIDISM: Status: ACTIVE | Noted: 2021-10-12

## 2022-03-19 PROBLEM — E78.00 HYPERCHOLESTEROLEMIA: Status: ACTIVE | Noted: 2021-10-12

## 2022-03-19 PROBLEM — F41.9 ANXIETY: Status: ACTIVE | Noted: 2021-10-12

## 2022-03-20 PROBLEM — F32.A DEPRESSIVE DISORDER: Status: ACTIVE | Noted: 2021-10-12

## 2022-04-11 DIAGNOSIS — N93.9 ABNORMAL VAGINAL BLEEDING: ICD-10-CM

## 2022-04-11 RX ORDER — LEVONORGESTREL AND ETHINYL ESTRADIOL 0.15-0.03
1 KIT ORAL DAILY
Qty: 1 DOSE PACK | Refills: 3 | Status: SHIPPED | OUTPATIENT
Start: 2022-04-11 | End: 2022-08-10 | Stop reason: SDUPTHER

## 2022-04-21 DIAGNOSIS — Z91.09 ENVIRONMENTAL ALLERGIES: Primary | ICD-10-CM

## 2022-04-21 RX ORDER — GUAIFENESIN 600 MG/1
600 TABLET, EXTENDED RELEASE ORAL 2 TIMES DAILY
Qty: 60 TABLET | Refills: 1 | Status: SHIPPED | OUTPATIENT
Start: 2022-04-21 | End: 2022-09-27 | Stop reason: ALTCHOICE

## 2022-04-21 RX ORDER — CETIRIZINE HCL 10 MG
10 TABLET ORAL DAILY
Qty: 90 TABLET | Refills: 1 | Status: SHIPPED | OUTPATIENT
Start: 2022-04-21 | End: 2022-09-27 | Stop reason: ALTCHOICE

## 2022-04-21 NOTE — PROGRESS NOTES
Patient calling with chronic cough, denies SOB or wheezing. No fever. Complains of itching eyes, dry cough.

## 2022-08-10 DIAGNOSIS — N93.9 ABNORMAL VAGINAL BLEEDING: ICD-10-CM

## 2022-08-10 RX ORDER — LEVONORGESTREL AND ETHINYL ESTRADIOL 0.15-0.03
1 KIT ORAL DAILY
Qty: 1 DOSE PACK | Refills: 3 | Status: SHIPPED | OUTPATIENT
Start: 2022-08-10 | End: 2022-08-11 | Stop reason: SDUPTHER

## 2022-08-10 NOTE — TELEPHONE ENCOUNTER
----- Message from Juan Crook sent at 8/9/2022  4:39 PM EDT -----  Subject: Message to Provider    QUESTIONS  Information for Provider? Patient is asking if her birth control could be   called in to her pharmacy. Please reach out in this regards   ---------------------------------------------------------------------------  --------------  [address] [employer]  [phone]; OK to leave message on voicemail  ---------------------------------------------------------------------------  --------------  SCRIPT ANSWERS  Relationship to Patient?  Self

## 2022-08-11 DIAGNOSIS — E78.1 HYPERTRIGLYCERIDEMIA: ICD-10-CM

## 2022-08-11 DIAGNOSIS — N93.9 ABNORMAL VAGINAL BLEEDING: ICD-10-CM

## 2022-08-11 RX ORDER — LEVONORGESTREL AND ETHINYL ESTRADIOL 0.15-0.03
1 KIT ORAL DAILY
Qty: 1 DOSE PACK | Refills: 3 | Status: SHIPPED | OUTPATIENT
Start: 2022-08-11

## 2022-08-11 RX ORDER — FENOFIBRATE 48 MG/1
48 TABLET, COATED ORAL DAILY
Qty: 90 TABLET | Refills: 1 | Status: SHIPPED | OUTPATIENT
Start: 2022-08-11

## 2022-09-21 DIAGNOSIS — F32.A DEPRESSION, UNSPECIFIED DEPRESSION TYPE: ICD-10-CM

## 2022-09-21 DIAGNOSIS — E03.9 ACQUIRED HYPOTHYROIDISM: ICD-10-CM

## 2022-09-23 RX ORDER — LEVOTHYROXINE SODIUM 100 UG/1
100 TABLET ORAL
Qty: 90 TABLET | Refills: 1 | Status: SHIPPED | OUTPATIENT
Start: 2022-09-23 | End: 2022-09-27 | Stop reason: SDUPTHER

## 2022-09-23 RX ORDER — ESCITALOPRAM OXALATE 10 MG/1
10 TABLET ORAL DAILY
Qty: 90 TABLET | Refills: 1 | Status: SHIPPED | OUTPATIENT
Start: 2022-09-23 | End: 2022-09-27 | Stop reason: SDUPTHER

## 2022-09-27 ENCOUNTER — OFFICE VISIT (OUTPATIENT)
Dept: FAMILY MEDICINE CLINIC | Age: 27
End: 2022-09-27
Payer: COMMERCIAL

## 2022-09-27 VITALS
HEIGHT: 62 IN | TEMPERATURE: 98.2 F | OXYGEN SATURATION: 99 % | DIASTOLIC BLOOD PRESSURE: 68 MMHG | SYSTOLIC BLOOD PRESSURE: 106 MMHG | HEART RATE: 65 BPM | RESPIRATION RATE: 16 BRPM | WEIGHT: 203.2 LBS | BODY MASS INDEX: 37.39 KG/M2

## 2022-09-27 DIAGNOSIS — E78.1 HYPERTRIGLYCERIDEMIA: ICD-10-CM

## 2022-09-27 DIAGNOSIS — E03.9 ACQUIRED HYPOTHYROIDISM: ICD-10-CM

## 2022-09-27 DIAGNOSIS — F41.9 ANXIETY: ICD-10-CM

## 2022-09-27 DIAGNOSIS — E66.01 SEVERE OBESITY (BMI 35.0-39.9) WITH COMORBIDITY (HCC): ICD-10-CM

## 2022-09-27 DIAGNOSIS — R79.89 ELEVATED LFTS: ICD-10-CM

## 2022-09-27 DIAGNOSIS — F32.A DEPRESSION, UNSPECIFIED DEPRESSION TYPE: ICD-10-CM

## 2022-09-27 DIAGNOSIS — Z83.3 FAMILY HISTORY OF DIABETES MELLITUS IN GRANDFATHER: Primary | ICD-10-CM

## 2022-09-27 PROCEDURE — 99213 OFFICE O/P EST LOW 20 MIN: CPT | Performed by: NURSE PRACTITIONER

## 2022-09-27 RX ORDER — LEVOTHYROXINE SODIUM 100 UG/1
100 TABLET ORAL
Qty: 90 TABLET | Refills: 1 | Status: SHIPPED | OUTPATIENT
Start: 2022-09-27

## 2022-09-27 RX ORDER — ESCITALOPRAM OXALATE 10 MG/1
10 TABLET ORAL DAILY
Qty: 90 TABLET | Refills: 1 | Status: SHIPPED | OUTPATIENT
Start: 2022-09-27

## 2022-09-27 NOTE — PROGRESS NOTES
The Jose Dodd 32 y.o. female presents today for:    Chief Complaint   Patient presents with    Follow-up    Medication Evaluation   [facility] 8-    OB/GYN-tested positive for HPV   --PAP smear was normal     Contemplating moving away from  to live with family in either [place]   --trying to separate from      Pt denies complaints; states anxiety is better and managed with medication    Review of Systems   Constitutional: Negative. HENT: Negative. Respiratory: Negative. Negative for cough, shortness of breath and wheezing. Cardiovascular: Negative. Negative for chest pain, palpitations and leg swelling. Gastrointestinal: Negative. Genitourinary: Negative. Musculoskeletal: Negative. Skin: Negative. Neurological: Negative. Negative for dizziness and headaches. Psychiatric/Behavioral:  Positive for depression. The patient is not nervous/anxious and does not have insomnia. Health Maintenance Due   Topic Date Due    DTaP/Tdap/Td series (1 - Tdap) Never done    COVID-19 Vaccine (3 - Booster for Moderna series) 10/15/2021    Flu Vaccine (1) 08/01/2022    Cervical cancer screen  09/09/2022        Past Medical History:   Diagnosis Date    Depression     Hypothyroid        Physical Exam  Constitutional:       General: She is not in acute distress. Appearance: She is obese. She is not toxic-appearing. Neck:      Vascular: No carotid bruit. Cardiovascular:      Rate and Rhythm: Normal rate and regular rhythm. Pulses: Normal pulses. Heart sounds: No murmur heard. Pulmonary:      Effort: No respiratory distress. Breath sounds: Normal breath sounds. No wheezing. Musculoskeletal:      Cervical back: Normal range of motion. Lymphadenopathy:      Cervical: No cervical adenopathy. Skin:     General: Skin is warm. Capillary Refill: Capillary refill takes less than 2 seconds. Neurological:      General: No focal deficit present. Mental Status: She is alert and oriented to person, place, and time. Psychiatric:         Thought Content: Thought content does not include homicidal or suicidal ideation. Thought content does not include homicidal or suicidal plan. Visit Vitals  /68 (BP 1 Location: Right arm, BP Patient Position: Sitting)   Pulse 65   Temp 98.2 °F (36.8 °C) (Temporal)   Resp 16   Ht 5' 2\" (1.575 m)   Wt 203 lb 3.2 oz (92.2 kg)   SpO2 99%   BMI 37.17 kg/m²       Current Outpatient Medications:     escitalopram oxalate (LEXAPRO) 10 mg tablet, Take 1 Tablet by mouth daily. , Disp: 90 Tablet, Rfl: 1    levothyroxine (SYNTHROID) 100 mcg tablet, Take 1 Tablet by mouth Daily (before breakfast). , Disp: 90 Tablet, Rfl: 1    levonorgestrel-ethinyl estradiol (NORDETTE) 0.15-0.03 mg tab, Take 1 Tablet by mouth in the morning., Disp: 1 Dose Pack, Rfl: 3    fenofibrate nanocrystallized (TRICOR) 48 mg tablet, Take 1 Tablet by mouth in the morning. Indications: high amount of triglyceride in the blood, Disp: 90 Tablet, Rfl: 1        ASSESSMENT and PLAN    ICD-10-CM ICD-9-CM    1. Family history of diabetes mellitus in grandfather  Z83.3 V18.0 HEMOGLOBIN A1C WITH EAG      HEMOGLOBIN A1C WITH EAG      2. Depression, unspecified depression type  F32. A 311 escitalopram oxalate (LEXAPRO) 10 mg tablet      3. Acquired hypothyroidism  E03.9 244.9 levothyroxine (SYNTHROID) 100 mcg tablet      TSH 3RD GENERATION      TSH 3RD GENERATION      4. Severe obesity (BMI 35.0-39. 9) with comorbidity ([facility] [address]  E66.01 278.01       5. Hypertriglyceridemia  E78.1 272.1 LIPID PANEL      6. Anxiety  F41.9 300.00       7. Elevated LFTs  F85.77 110.1 METABOLIC PANEL, COMPREHENSIVE        Follow-up and Dispositions    Return in about 6 months (around 3/27/2023).        lab results and schedule of future lab studies reviewed with patient  reviewed diet, exercise and weight control  cardiovascular risk and specific lipid/LDL goals reviewed  reviewed medications and side effects in detail    Carmen Brown NP

## 2022-09-27 NOTE — PROGRESS NOTES
Patient declined flu vaccine today. Patient last menstrual cycle was August 2022. Jam Rivero presents today for   Chief Complaint   Patient presents with    Follow-up    Medication Evaluation       Is someone accompanying this pt? no    Is the patient using any DME equipment during [address]? no    Depression Screening:  3 most recent PHQ Screens 9/27/2022   Little interest or pleasure in doing things Not at all   Feeling down, depressed, irritable, or hopeless Not at all   Total Score PHQ 2 0   Trouble falling or staying asleep, or sleeping too much -   Feeling tired or having little energy -   Poor appetite, weight loss, or overeating -   Feeling bad about yourself - or that you are a failure or have let yourself or your family down -   Trouble concentrating on things such as school, work, reading, or watching TV -   Moving or speaking so slowly that other people could have noticed; or the opposite being so fidgety that others notice -   Thoughts of being better off dead, or hurting yourself in some way -   PHQ 9 Score -   How difficult have these problems made it for you to do your work, take care of your home and get along with others -       Learning Assessment:  Learning Assessment 8/20/2021   PRIMARY LEARNER Patient   HIGHEST LEVEL OF EDUCATION - PRIMARY LEARNER  SOME COLLEGE   BARRIERS PRIMARY LEARNER NONE   CO-LEARNER CAREGIVER No   PRIMARY LANGUAGE ENGLISH   LEARNER PREFERENCE PRIMARY DEMONSTRATION   ANSWERED BY patient   RELATIONSHIP SELF       Health Maintenance reviewed and discussed and ordered per Provider. Health Maintenance Due   Topic Date Due    DTaP/Tdap/Td series (1 - Tdap) Never done    COVID-19 Vaccine (3 - Booster for Moderna series) 10/15/2021    Flu Vaccine (1) 08/01/2022    Cervical cancer screen  09/09/2022   . Coordination of Care:  1. Have you been to the ER, urgent care clinic since your last visit? Hospitalized since your last visit? no    2.  Have you seen or consulted any other health care providers outside of the [address] [place] [address] since your last visit? Include any pap smears or colon screening. No      3. For patients aged 39-70: Has the patient had a colonoscopy / FIT/ Cologuard? NA - based on age      If the patient is female:    4. For patients aged 41-77: Has the patient had a mammogram within the past 2 years? NA - based on age or sex      11. For patients aged 21-65: Has the patient had a pap smear? Yes - Care Gap present.  Rooming MA/LPN to request most recent results

## 2022-09-28 LAB
ALBUMIN SERPL-MCNC: 4.5 G/DL (ref 3.9–5)
ALBUMIN/GLOB SERPL: 1.8 {RATIO} (ref 1.2–2.2)
ALP SERPL-CCNC: 53 IU/L (ref 44–121)
ALT SERPL-CCNC: 97 IU/L (ref 0–32)
AST SERPL-CCNC: 58 IU/L (ref 0–40)
BILIRUB SERPL-MCNC: 0.2 MG/DL (ref 0–1.2)
BUN SERPL-MCNC: 11 MG/DL (ref 6–20)
BUN/CREAT SERPL: 12 (ref 9–23)
CALCIUM SERPL-MCNC: 9.2 MG/DL (ref 8.7–10.2)
CHLORIDE SERPL-SCNC: 103 MMOL/L (ref 96–106)
CHOLEST SERPL-MCNC: 139 MG/DL (ref 100–199)
CO2 SERPL-SCNC: 21 MMOL/L (ref 20–29)
CREAT SERPL-MCNC: 0.9 MG/DL (ref 0.57–1)
EGFR: 89 ML/MIN/1.73
EST. AVERAGE GLUCOSE BLD GHB EST-MCNC: 114 MG/DL
GLOBULIN SER CALC-MCNC: 2.5 G/DL (ref 1.5–4.5)
GLUCOSE SERPL-MCNC: 87 MG/DL (ref 70–99)
HBA1C MFR BLD: 5.6 % (ref 4.8–5.6)
HDLC SERPL-MCNC: 41 MG/DL
IMP & REVIEW OF LAB RESULTS: NORMAL
LDLC SERPL CALC-MCNC: 72 MG/DL (ref 0–99)
POTASSIUM SERPL-SCNC: 4.8 MMOL/L (ref 3.5–5.2)
PROT SERPL-MCNC: 7 G/DL (ref 6–8.5)
SODIUM SERPL-SCNC: 143 MMOL/L (ref 134–144)
TRIGL SERPL-MCNC: 150 MG/DL (ref 0–149)
TSH SERPL DL<=0.005 MIU/L-ACNC: 1.04 UIU/ML (ref 0.45–4.5)
VLDLC SERPL CALC-MCNC: 26 MG/DL (ref 5–40)

## 2022-10-07 NOTE — PROGRESS NOTES
Can you please call patient to let the patient know that her labs are abnormal but liver function tests have decreased slightly. TSH is within normal limits. Pt is not a pre-diabetic and triglycerides have improved from 229 to 150! Thanks!

## 2022-10-18 NOTE — PROGRESS NOTES
2nd attempt to call patient to inform of labs results. No answer; left voicemail for patient to call office back.

## 2022-10-20 DIAGNOSIS — K76.0 FATTY LIVER: ICD-10-CM

## 2022-10-20 DIAGNOSIS — R79.89 ELEVATED LFTS: Primary | ICD-10-CM

## 2022-10-27 LAB — SARS-COV-2, NAA: NOT DETECTED

## 2023-02-20 ENCOUNTER — TELEPHONE (OUTPATIENT)
Facility: CLINIC | Age: 28
End: 2023-02-20

## 2023-02-21 ENCOUNTER — OFFICE VISIT (OUTPATIENT)
Facility: CLINIC | Age: 28
End: 2023-02-21
Payer: COMMERCIAL

## 2023-02-21 VITALS
RESPIRATION RATE: 16 BRPM | BODY MASS INDEX: 35.88 KG/M2 | DIASTOLIC BLOOD PRESSURE: 64 MMHG | SYSTOLIC BLOOD PRESSURE: 114 MMHG | WEIGHT: 195 LBS | HEART RATE: 77 BPM | OXYGEN SATURATION: 98 % | HEIGHT: 62 IN | TEMPERATURE: 98.3 F

## 2023-02-21 DIAGNOSIS — F32.A DEPRESSION, UNSPECIFIED DEPRESSION TYPE: Primary | ICD-10-CM

## 2023-02-21 DIAGNOSIS — F41.9 ANXIETY DISORDER, UNSPECIFIED TYPE: ICD-10-CM

## 2023-02-21 DIAGNOSIS — N92.6 IRREGULAR MENSTRUATION, UNSPECIFIED: ICD-10-CM

## 2023-02-21 DIAGNOSIS — E78.1 HYPERTRIGLYCERIDEMIA: ICD-10-CM

## 2023-02-21 DIAGNOSIS — E03.9 HYPOTHYROIDISM, UNSPECIFIED TYPE: ICD-10-CM

## 2023-02-21 DIAGNOSIS — Z83.3 FAMILY HISTORY OF DIABETES MELLITUS: ICD-10-CM

## 2023-02-21 DIAGNOSIS — E66.01 SEVERE OBESITY (BMI 35.0-39.9) WITH COMORBIDITY (HCC): ICD-10-CM

## 2023-02-21 DIAGNOSIS — Z11.4 SCREENING FOR HIV (HUMAN IMMUNODEFICIENCY VIRUS): ICD-10-CM

## 2023-02-21 DIAGNOSIS — M54.50 PAIN IN RIGHT LUMBAR REGION OF BACK: ICD-10-CM

## 2023-02-21 PROCEDURE — 99214 OFFICE O/P EST MOD 30 MIN: CPT | Performed by: NURSE PRACTITIONER

## 2023-02-21 RX ORDER — SPIRONOLACTONE 100 MG/1
TABLET, FILM COATED ORAL
COMMUNITY
Start: 2023-01-21

## 2023-02-21 RX ORDER — FENOFIBRATE 48 MG/1
48 TABLET, COATED ORAL DAILY
Qty: 90 TABLET | Refills: 1 | Status: SHIPPED | OUTPATIENT
Start: 2023-02-21

## 2023-02-21 RX ORDER — ESCITALOPRAM OXALATE 10 MG/1
10 TABLET ORAL DAILY
Qty: 90 TABLET | Refills: 1 | Status: SHIPPED | OUTPATIENT
Start: 2023-02-21

## 2023-02-21 RX ORDER — LEVONORGESTREL AND ETHINYL ESTRADIOL 0.15-0.03
1 KIT ORAL DAILY
Qty: 3 PACKET | Refills: 2 | Status: SHIPPED | OUTPATIENT
Start: 2023-02-21

## 2023-02-21 RX ORDER — CYCLOBENZAPRINE HCL 10 MG
10 TABLET ORAL NIGHTLY PRN
Qty: 10 TABLET | Refills: 0 | Status: SHIPPED | OUTPATIENT
Start: 2023-02-21 | End: 2023-03-03

## 2023-02-21 RX ORDER — LEVOTHYROXINE SODIUM 0.1 MG/1
100 TABLET ORAL
Qty: 30 TABLET | Refills: 2 | Status: SHIPPED | OUTPATIENT
Start: 2023-02-21

## 2023-02-21 SDOH — ECONOMIC STABILITY: INCOME INSECURITY: HOW HARD IS IT FOR YOU TO PAY FOR THE VERY BASICS LIKE FOOD, HOUSING, MEDICAL CARE, AND HEATING?: NOT VERY HARD

## 2023-02-21 SDOH — ECONOMIC STABILITY: FOOD INSECURITY: WITHIN THE PAST 12 MONTHS, THE FOOD YOU BOUGHT JUST DIDN'T LAST AND YOU DIDN'T HAVE MONEY TO GET MORE.: SOMETIMES TRUE

## 2023-02-21 SDOH — ECONOMIC STABILITY: HOUSING INSECURITY
IN THE LAST 12 MONTHS, WAS THERE A TIME WHEN YOU DID NOT HAVE A STEADY PLACE TO SLEEP OR SLEPT IN A SHELTER (INCLUDING NOW)?: NO

## 2023-02-21 SDOH — ECONOMIC STABILITY: FOOD INSECURITY: WITHIN THE PAST 12 MONTHS, YOU WORRIED THAT YOUR FOOD WOULD RUN OUT BEFORE YOU GOT MONEY TO BUY MORE.: SOMETIMES TRUE

## 2023-02-21 ASSESSMENT — PATIENT HEALTH QUESTIONNAIRE - PHQ9
SUM OF ALL RESPONSES TO PHQ QUESTIONS 1-9: 0
SUM OF ALL RESPONSES TO PHQ QUESTIONS 1-9: 0
1. LITTLE INTEREST OR PLEASURE IN DOING THINGS: 0
SUM OF ALL RESPONSES TO PHQ9 QUESTIONS 1 & 2: 0
2. FEELING DOWN, DEPRESSED OR HOPELESS: 0
SUM OF ALL RESPONSES TO PHQ QUESTIONS 1-9: 0
SUM OF ALL RESPONSES TO PHQ QUESTIONS 1-9: 0

## 2023-02-21 NOTE — PROGRESS NOTES
Patient last menstrual cycle was January 26. Ángel Hardy presents today for   Chief Complaint   Patient presents with    Back Pain     For 2 weeks        Is someone accompanying this pt? No     Is the patient using any DME equipment during OV? No     Depression Screening:  No flowsheet data found. Learning Assessment:  No flowsheet data found. Health Maintenance reviewed and discussed and ordered per Provider. Health Maintenance Due   Topic Date Due    Varicella vaccine (1 of 2 - 2-dose childhood series) Never done    HIV screen  Never done    COVID-19 Vaccine (3 - Booster for Moderna series) 07/10/2021    Flu vaccine (1) 08/01/2022    Pap smear  09/09/2022   . Coordination of Care:  1. Have you been to the ER, urgent care clinic since your last visit? Hospitalized since your last visit? Yes [facility] back pain     2. Have you seen or consulted any other health care providers outside of the [address] [place] [address] since your last visit? Include any pap smears or colon screening. No     3. For patients aged 39-70: Has the patient had a colonoscopy / FIT/ Cologuard? N/A      If the patient is female:    4. For patients aged 41-77: Has the patient had a mammogram within the past 2 years? no      5. For patients aged 21-65: Has the patient had a pap smear?  Yes women [facility]

## 2023-02-21 NOTE — LETTER
[facility]  [address]  [address] [facility] [place] [address]  Phone: [phone]  Fax: [phone]    MIKE Armstrong NP        February 21, 2023     Patient: Jon Puri   YOB: 1995   Date of Visit: 2/21/2023       To Whom It May Concern: It is my medical opinion that Jon Puri should remain out of work until 2/25/2023. If you have any questions or concerns, please don't hesitate to call.     Sincerely,        MIKE Armstrong NP

## 2023-02-21 NOTE — PROGRESS NOTES
[address] [place] [address]               [phone]      Juan C To is a 32 y.o. female and presents with Back Pain (For 2 weeks )       Assessment/Plan:    1. Depression, unspecified depression type  -     escitalopram (LEXAPRO) 10 MG tablet; Take 1 tablet by mouth daily, Disp-90 tablet, R-1Normal  2. Severe obesity (BMI 35.0-39. 9) with comorbidity ([address]  3. Anxiety disorder, unspecified type  4. Hypothyroidism, unspecified type  -     levothyroxine (SYNTHROID) 100 MCG tablet; Take 1 tablet by mouth every morning (before breakfast), Disp-30 tablet, R-2Normal  -     TSH; Future  5. Family history of diabetes mellitus  6. Hypertriglyceridemia  -     fenofibrate (TRICOR) 48 MG tablet; Take 1 tablet by mouth daily, Disp-90 tablet, R-1Normal  7. Pain in right lumbar region of back  -     XR LUMBAR SPINE (2-3 VIEWS); Future  -     cyclobenzaprine (FLEXERIL) 10 MG tablet; Take 1 tablet by mouth nightly as needed for Muscle spasms, Disp-10 tablet, R-0Normal  8. Screening for HIV (human immunodeficiency virus)  -     HIV 1/2 Ag/Ab, 4TH Generation,W Rflx Confirm; Future  9. Irregular menstruation, unspecified  -     levonorgestrel-ethinyl estradiol (NORDETTE) 0.15-30 MG-MCG per tablet; Take 1 tablet by mouth daily, Disp-3 packet, R-2Normal       Follow up and disposition:   No follow-ups on file. Health Maintenance:   Health Maintenance   Topic Date Due    HIV screen  Never done    COVID-19 Vaccine (4 - Booster) 07/10/2021    Pap smear  09/09/2022    Flu vaccine (1) 02/21/2024 (Originally 8/1/2022)    Depression Monitoring  02/21/2024    DTaP/Tdap/Td vaccine (2 - Td or Tdap) 05/19/2026    Hepatitis A vaccine  Aged Out    Hib vaccine  Aged Out    Meningococcal (ACWY) vaccine  Aged Out    Pneumococcal 0-64 years Vaccine  Aged Out    Varicella vaccine  Discontinued    Hepatitis C screen  Discontinued        Subjective:    Labs obtained prior to visit?  No  Reviewed with patient? N/A  [facility] 1-    Patient was seen at [facility] for back spasm  --robaxin and other medication are not helping  --motrin helped with pain   --denies difficulty urinating or having BM  --rule out cauda equina     OB/GYN-tested positive for HPV   --PAP smear was normal     ROS:     Review of Systems   Constitutional:  Negative for chills and fatigue. HENT: Negative. Respiratory:  Negative for chest tightness, shortness of breath and wheezing. Cardiovascular:  Negative for chest pain, palpitations and leg swelling. Gastrointestinal: Negative. Negative for abdominal pain, constipation and diarrhea. Genitourinary: Negative. Negative for dysuria and frequency. Musculoskeletal:  Positive for back pain. Skin: Negative. Neurological: Negative. Hematological: Negative. Psychiatric/Behavioral:  Negative for dysphoric mood, sleep disturbance and suicidal ideas. The patient is nervous/anxious. The problem list was updated as a part of today's visit. Patient Active Problem List   Diagnosis    Hypothyroidism    Anxiety disorder    Hypercholesterolemia    Depression    Severe obesity (BMI 35.0-39. 9) with comorbidity ([address]    Hypertriglyceridemia    Family history of diabetes mellitus    Pain in right lumbar region of back       [facility],  were reviewed. SH:  Social History     Tobacco Use    Smoking status: Never    Smokeless tobacco: Never   Substance Use Topics    Alcohol use: Yes    Drug use: Not Currently       Medications/Allergies:  Current Outpatient Medications on File Prior to Visit   Medication Sig Dispense Refill    spironolactone (ALDACTONE) 100 MG tablet TAKE 1/2 TABLET BY MOUTH DAILY FOR 2 WEEKS THEN INCREASE TO 1 TAB DAILY AS TOLERATED       No current facility-administered medications on file prior to visit.         No Known Allergies    Objective:  /64   Pulse 77   Temp 98.3 °F (36.8 °C) (Temporal)   Resp 16   Ht 5' 2\" (1.575 m)   Wt 195 lb (88.5 kg)   SpO2 98%   BMI 35.67 kg/m²  Body mass index is 35.67 kg/m². Physical assessment  Physical Exam  Constitutional:       General: She is not in acute distress. Appearance: She is obese. She is not toxic-appearing. Cardiovascular:      Pulses: Normal pulses. Heart sounds: Normal heart sounds. Pulmonary:      Effort: Pulmonary effort is normal.      Breath sounds: Normal breath sounds. Abdominal:      General: Bowel sounds are normal. There is no distension. Palpations: Abdomen is soft. Tenderness: There is no abdominal tenderness. Musculoskeletal:         General: Tenderness present. Right lower leg: No edema. Left lower leg: No edema. Comments: Lumbar region   Neurological:      General: No focal deficit present. Mental Status: She is alert and oriented to person, place, and time. Cranial Nerves: No cranial nerve deficit. Sensory: No sensory deficit. Motor: No weakness. Gait: Gait normal.               I have discussed the diagnosis with the patient and the intended plan as seen in the above orders. The patient has received an After-Visit Summary and questions were answered concerning future plans. An After Visit Summary was printed and given to the patient. All diagnosis have been discussed with the patient and all of the patient's questions have been answered. Sydney Meyer NP-C  [address] [facility]   [address] [facility] [address]  [address]

## 2023-02-22 LAB
HIV 1+2 AB+HIV1 P24 AG SERPL QL IA: NON REACTIVE
SPECIMEN STATUS REPORT, ROLRST: NORMAL
TSH SERPL DL<=0.005 MIU/L-ACNC: 0.99 UIU/ML (ref 0.45–4.5)

## 2023-02-23 NOTE — PROGRESS NOTES
Can you please call patient to let the patient know that her labs is normal for thyroid and HIV. Thanks!

## 2023-02-28 ENCOUNTER — TELEPHONE (OUTPATIENT)
Facility: CLINIC | Age: 28
End: 2023-02-28

## 2023-02-28 DIAGNOSIS — Q76.49 TRANSITIONAL VERTEBRA: Primary | ICD-10-CM

## 2023-02-28 DIAGNOSIS — M54.50 PAIN IN RIGHT LUMBAR REGION OF BACK: ICD-10-CM

## 2023-02-28 ASSESSMENT — ENCOUNTER SYMPTOMS
GASTROINTESTINAL NEGATIVE: 1
CONSTIPATION: 0
BACK PAIN: 1
CHEST TIGHTNESS: 0
SHORTNESS OF BREATH: 0
DIARRHEA: 0
ABDOMINAL PAIN: 0
WHEEZING: 0

## 2023-02-28 NOTE — TELEPHONE ENCOUNTER
Notified patient of lumbar xray showing transitional vertebral body L5. Pt states back pain is improving but still present. Will refer to orthopedics.

## 2023-03-27 ENCOUNTER — OFFICE VISIT (OUTPATIENT)
Facility: CLINIC | Age: 28
End: 2023-03-27
Payer: COMMERCIAL

## 2023-03-27 VITALS
WEIGHT: 198 LBS | OXYGEN SATURATION: 98 % | BODY MASS INDEX: 36.44 KG/M2 | HEIGHT: 62 IN | SYSTOLIC BLOOD PRESSURE: 114 MMHG | DIASTOLIC BLOOD PRESSURE: 76 MMHG | RESPIRATION RATE: 16 BRPM | TEMPERATURE: 98.3 F | HEART RATE: 91 BPM

## 2023-03-27 DIAGNOSIS — M54.50 PAIN IN RIGHT LUMBAR REGION OF BACK: Primary | ICD-10-CM

## 2023-03-27 DIAGNOSIS — Q76.49 TRANSITIONAL VERTEBRA: ICD-10-CM

## 2023-03-27 DIAGNOSIS — F32.A DEPRESSION, UNSPECIFIED DEPRESSION TYPE: ICD-10-CM

## 2023-03-27 DIAGNOSIS — E78.1 HYPERTRIGLYCERIDEMIA: ICD-10-CM

## 2023-03-27 DIAGNOSIS — M54.16 LUMBAR RADICULOPATHY: ICD-10-CM

## 2023-03-27 DIAGNOSIS — E03.9 HYPOTHYROIDISM, UNSPECIFIED TYPE: ICD-10-CM

## 2023-03-27 PROCEDURE — 99213 OFFICE O/P EST LOW 20 MIN: CPT | Performed by: NURSE PRACTITIONER

## 2023-03-27 RX ORDER — LEVOTHYROXINE SODIUM 0.1 MG/1
100 TABLET ORAL
Qty: 90 TABLET | Refills: 2 | Status: SHIPPED | OUTPATIENT
Start: 2023-03-27

## 2023-03-27 ASSESSMENT — PATIENT HEALTH QUESTIONNAIRE - PHQ9
SUM OF ALL RESPONSES TO PHQ QUESTIONS 1-9: 1
1. LITTLE INTEREST OR PLEASURE IN DOING THINGS: 1
SUM OF ALL RESPONSES TO PHQ9 QUESTIONS 1 & 2: 1
2. FEELING DOWN, DEPRESSED OR HOPELESS: 0

## 2023-03-27 NOTE — PROGRESS NOTES
Patient last menstrua cycle was 3/27. Patient do not have the 4th covid vaccine. Waiting on records for Pap. Gloria Rutledge presents today for   Chief Complaint   Patient presents with    Follow-up       Is someone accompanying this pt? No     Is the patient using any DME equipment during OV? no    Depression Screening:  No flowsheet data found. Learning Assessment:  No flowsheet data found. Health Maintenance reviewed and discussed and ordered per Provider. Health Maintenance Due   Topic Date Due    COVID-19 Vaccine (4 - Booster) 07/10/2021    Cervical cancer screen  09/09/2022   . Coordination of Care:  1. Have you been to the ER, urgent care clinic since your last visit? Hospitalized since your last visit? Yes 3/23 back pain [facility]     2. Have you seen or consulted any other health care providers outside of the [address] [place] [address] since your last visit? Include any pap smears or colon screening. No     3. For patients aged 39-70: Has the patient had a colonoscopy / FIT/ Cologuard? N/a      If the patient is female:    4. For patients aged 41-77: Has the patient had a mammogram within the past 2 years? N/a      5. For patients aged 21-65: Has the patient had a pap smear?  Yes Dr. Yao Dang

## 2023-03-27 NOTE — PROGRESS NOTES
Mikki Priest is a 25 y.o. female and presents with Follow-up       Assessment/Plan:    1. Pain in right lumbar region of back  -     [facility] [place], [place]  2. Hypothyroidism, unspecified type  -     levothyroxine (SYNTHROID) 100 MCG tablet; Take 1 tablet by mouth every morning (before breakfast), Disp-90 tablet, R-2Normal  3. Transitional vertebra  -     [facility] [place], [place]  4. Lumbar radiculopathy  -     [facility] [place], [place]  5. Depression, unspecified depression type  6. Hypertriglyceridemia         Follow up and disposition:   Return in about 6 months (around 9/27/2023). Subjective:    Labs obtained prior to visit? No  Reviewed with patient? N/A    LNMP 3-    PAP 2021;abnormal   Repeat PAP 2022/Colposcopy were normal  --follow-up in 1 year     Recently seen in the ER for back pain  --will start physical therapy   --pt has upcoming appt with PMR 4/2023    ROS:     Review of Systems   Constitutional: Negative. Negative for chills, fatigue and fever. HENT: Negative. Respiratory: Negative. Negative for chest tightness, shortness of breath and wheezing. Cardiovascular: Negative. Negative for chest pain, palpitations and leg swelling. Gastrointestinal: Negative. Genitourinary: Negative. Negative for dysuria. Musculoskeletal:  Positive for back pain. Negative for gait problem and joint swelling. Neurological: Negative. Negative for dizziness, numbness and headaches. Psychiatric/Behavioral: Negative. Negative for dysphoric mood. The patient is not nervous/anxious. The problem list was updated as a part of today's visit. Patient Active Problem List   Diagnosis    Hypothyroidism    Anxiety disorder    Hypercholesterolemia    Depression    Severe obesity (BMI 35.0-39. 9) with comorbidity ([address]    Hypertriglyceridemia    Family history of diabetes

## 2023-03-31 ASSESSMENT — ENCOUNTER SYMPTOMS
GASTROINTESTINAL NEGATIVE: 1
BACK PAIN: 1
SHORTNESS OF BREATH: 0
WHEEZING: 0
RESPIRATORY NEGATIVE: 1
CHEST TIGHTNESS: 0

## 2023-04-14 ENCOUNTER — APPOINTMENT (OUTPATIENT)
Facility: HOSPITAL | Age: 28
End: 2023-04-14
Payer: COMMERCIAL

## 2023-04-17 ENCOUNTER — HOSPITAL ENCOUNTER (OUTPATIENT)
Facility: HOSPITAL | Age: 28
Setting detail: RECURRING SERIES
Discharge: HOME | End: 2023-04-20
Payer: COMMERCIAL

## 2023-04-17 PROCEDURE — 97140 MANUAL THERAPY 1/> REGIONS: CPT

## 2023-04-17 PROCEDURE — 97535 SELF CARE MNGMENT TRAINING: CPT

## 2023-04-17 PROCEDURE — 97162 PT EVAL MOD COMPLEX 30 MIN: CPT

## 2023-04-17 NOTE — THERAPY EVALUATION
bridge - full without pain  Current deficits include significant weakness in the hips and core leading to pain with prolonged positions. I also advised her to schedule with her gynecologist to rule out any referred pain due to cyclical pattern around menstrual cycle and because I was unable to reproduce her pain today with any movements. Pt will benefit from a comprehensive POC/HEP to address impairments and restore function in order to return to prior level of function and prevent secondary impairments. Evaluation Complexity:  History:  HIGH Complexity :3+ comorbidities / personal factors will impact the outcome/ POC ; Examination:  HIGH Complexity : 4+ Standardized tests and measures addressing body structure, function, activity limitation and / or participation in recreation  ;Presentation:  MEDIUM Complexity : Evolving with changing characteristics  ; Clinical Decision Making:  MEDIUM Complexity : FOTO score of 26-74 FOTO score = an established functional score where 100 = no disability  Overall Complexity Rating: MEDIUM  Problem List: pain affecting function, decrease ROM, decrease strength, impaired gait/balance, decrease ADL/functional abilities, decrease activity tolerance, decrease flexibility/joint mobility, and decrease transfer abilities    Treatment Plan may include any combination of the followin Therapeutic Exercise, 28298 Neuromuscular Re-Education, 15539 Manual Therapy, 19912 Therapeutic Activity, 98172 Self Care/Home Management, 27899 Electrical Stim unattended, 66265 Electrical Stim attended, 87260 Vasopneumatic Device, [id number] Aquatic Therapy, [id number] Gait Training, [id number] Ultrasound, [id number] Mechanical Traction, [id number] Needle Insertion w/o Injection (1 or 2 muscles), and 97176 Needle Insertion w/o Injection (3+ muscles)  Patient / Family readiness to learn indicated by: asking questions, trying to perform skills, interest, return verbalization , and return demonstration   Persons(s) to be

## 2023-04-17 NOTE — PROGRESS NOTES
min = 4 units; 68-82 min = 5 units   Total Total     [x]  Patient Education billed concurrently with other procedures   [x] Review HEP    [] Progressed/Changed HEP, detail:    [] Other detail:       Objective Information/Functional Measures/Assessment    See POC    Patient will continue to benefit from skilled PT / OT services to modify and progress therapeutic interventions, analyze and address functional mobility deficits, analyze and address ROM deficits, analyze and address strength deficits, analyze and address soft tissue restrictions, analyze and cue for proper movement patterns, analyze and modify for postural abnormalities, and instruct in home and community integration to address functional deficits and attain remaining goals.     Progress toward goals / Updated goals:  [x]  See POC    See POC    PLAN  yes Continue plan of care  []  Upgrade activities as tolerated  []  Discharge due to :  []  Other:    Kayla Leon PT    4/17/2023    7:53 AM    Future Appointments   Date Time Provider Reggie Mota   4/17/2023  2:20 PM Kayla Leon PT [address]   4/19/2023  9:00 AM Anastasia Kim MD [facility]

## 2023-04-19 ENCOUNTER — OFFICE VISIT (OUTPATIENT)
Age: 28
End: 2023-04-19
Payer: COMMERCIAL

## 2023-04-19 VITALS
DIASTOLIC BLOOD PRESSURE: 64 MMHG | SYSTOLIC BLOOD PRESSURE: 98 MMHG | TEMPERATURE: 97.4 F | OXYGEN SATURATION: 99 % | RESPIRATION RATE: 18 BRPM | HEART RATE: 78 BPM | BODY MASS INDEX: 37.1 KG/M2 | WEIGHT: 201.6 LBS | HEIGHT: 62 IN

## 2023-04-19 DIAGNOSIS — M54.50 ACUTE RIGHT-SIDED LOW BACK PAIN WITHOUT SCIATICA: Primary | ICD-10-CM

## 2023-04-19 PROCEDURE — 99204 OFFICE O/P NEW MOD 45 MIN: CPT | Performed by: PHYSICAL MEDICINE & REHABILITATION

## 2023-04-19 RX ORDER — MELOXICAM 15 MG/1
15 TABLET ORAL DAILY PRN
Qty: 30 TABLET | Refills: 0 | Status: SHIPPED | OUTPATIENT
Start: 2023-04-19 | End: 2023-05-19

## 2023-04-19 SDOH — HEALTH STABILITY: PHYSICAL HEALTH: ON AVERAGE, HOW MANY DAYS PER WEEK DO YOU ENGAGE IN MODERATE TO STRENUOUS EXERCISE (LIKE A BRISK WALK)?: 3 DAYS

## 2023-04-19 SDOH — HEALTH STABILITY: PHYSICAL HEALTH: ON AVERAGE, HOW MANY MINUTES DO YOU ENGAGE IN EXERCISE AT THIS LEVEL?: 60 MIN

## 2023-04-19 ASSESSMENT — SOCIAL DETERMINANTS OF HEALTH (SDOH)
WITHIN THE LAST YEAR, HAVE YOU BEEN AFRAID OF YOUR PARTNER OR EX-PARTNER?: PATIENT DECLINED
WITHIN THE LAST YEAR, HAVE TO BEEN RAPED OR FORCED TO HAVE ANY KIND OF SEXUAL ACTIVITY BY YOUR PARTNER OR EX-PARTNER?: NO
WITHIN THE LAST YEAR, HAVE YOU BEEN KICKED, HIT, SLAPPED, OR OTHERWISE PHYSICALLY HURT BY YOUR PARTNER OR EX-PARTNER?: NO
WITHIN THE LAST YEAR, HAVE YOU BEEN HUMILIATED OR EMOTIONALLY ABUSED IN OTHER WAYS BY YOUR PARTNER OR EX-PARTNER?: NO

## 2023-04-19 NOTE — PROGRESS NOTES
Angelia Sotelo presents today for   Chief Complaint   Patient presents with    Back Problem    Pain    Back Pain       Is someone accompanying this pt? no    Is the patient using any DME equipment during OV? no    Depression Screening:  No flowsheet data found. Learning Assessment:  No flowsheet data found. Abuse Screening:  No flowsheet data found. Fall Risk  No flowsheet data found. OPIOID RISK TOOL  No flowsheet data found. Coordination of Care:  1. Have you been to the ER, urgent care clinic since your last visit? Yes back pain  Hospitalized since your last visit? no    2. Have you seen or consulted any other health care providers outside of the [address] [place] [address] since your last visit? no Include any pap smears or colon screening.  no

## 2023-04-19 NOTE — PROGRESS NOTES
[facility]  [address], [address] [facility], [address] [place] [address] Street  Phone: [phone]  Fax: [phone]      Patient: Sharyle Slimmer                                                                              MRN: [id number]        YOB: 1995          AGE: 25 y.o. PCP: MIKE Dsouza - NP  Date:  04/19/23    Reason for Consultation: Back Problem, Pain, and Back Pain      HPI:  Sharyle Slimmer is a 25 y.o. female with relevant PMH of hypothyroid, HLD who presents with right sided low back pain. The first episode was in February 2022 when she had one week of low back pain, they symptoms resolved but one year later Feb 2023- pain returned and it has not resolved over the past few months. Neurologic symptoms: No numbness, tingling, weakness, bowel or bladder changes. No recent falls      Location: The pain is located in the right low back pain   Radiation: The pain does not radiate . Pain Score: Currently: 2/10    Quality: Pain is of a cramping, stiff quality. Aggravating: Pain is exacerbated by standing  Alleviating: The pain is alleviated by  stretches    Prior Treatments:  Physical therapy: Yes- has had one session   Injections:No  Surgery:No  Previous Medications: cyclobenzaprine- too drowsy, medrol pack  Current Medications:motrin  Previous work-up has included:   the actual images of the studies below were reviewed, visualized and interpreted by me.     2/21/2023 X-ray lumbar spine- L5 with bilateral sacralization    Past Medical History:   Past Medical History:   Diagnosis Date    Depression     Hypothyroid       Past Surgical History:   Past Surgical History:   Procedure Laterality Date    COLONOSCOPY        SocHx:   Social History     Tobacco Use    Smoking status: Never    Smokeless tobacco: Never   Substance Use Topics    Alcohol use: Yes      FamHx:?    Family History   Problem Relation Age of Onset    Hypertension

## 2023-04-21 ENCOUNTER — HOSPITAL ENCOUNTER (OUTPATIENT)
Facility: HOSPITAL | Age: 28
Setting detail: RECURRING SERIES
Discharge: HOME | End: 2023-04-24
Payer: COMMERCIAL

## 2023-04-21 PROCEDURE — 97110 THERAPEUTIC EXERCISES: CPT

## 2023-04-21 PROCEDURE — 97530 THERAPEUTIC ACTIVITIES: CPT

## 2023-04-21 PROCEDURE — 97535 SELF CARE MNGMENT TRAINING: CPT

## 2023-04-21 PROCEDURE — 97112 NEUROMUSCULAR REEDUCATION: CPT

## 2023-04-24 ENCOUNTER — HOSPITAL ENCOUNTER (OUTPATIENT)
Facility: HOSPITAL | Age: 28
Setting detail: RECURRING SERIES
Discharge: HOME | End: 2023-04-27
Payer: COMMERCIAL

## 2023-04-24 PROCEDURE — 97530 THERAPEUTIC ACTIVITIES: CPT

## 2023-04-24 PROCEDURE — 97112 NEUROMUSCULAR REEDUCATION: CPT

## 2023-04-24 PROCEDURE — 97110 THERAPEUTIC EXERCISES: CPT

## 2023-04-24 NOTE — PROGRESS NOTES
PHYSICAL / OCCUPATIONAL THERAPY - DAILY TREATMENT NOTE (updated )    Patient Name: Angelica Rosario    Date: 2023    : 1995  Insurance: Payor: Shoshana Dos Santos / Plan: Nichelle Dao / Product Type: *No Product type* /      Patient  verified Yes     Visit #   Current / Total 3 16   Time   In / Out 900 940   Pain   In / Out 1 0-1   Subjective Functional Status/Changes: Patient states she was sore post last session. Said she was concerned about performing HEP as a result, but did it anyway. Changes to:  Meds, Allergies, Med Hx, Sx Hx? If yes, update Summary List yes       TREATMENT AREA =  Other low back pain [M54.59]    OBJECTIVE         Therapeutic Procedures: Tx Min Billable or 1:1 Min (if diff from Tx Min) Procedure, Rationale, Specifics   20  35028 Therapeutic Exercise (timed):  increase ROM, strength, coordination, balance, and proprioception to improve patient's ability to progress to PLOF and address remaining functional goals. (see flow sheet as applicable)     Details if applicable:       10  66439 Neuromuscular Re-Education (timed):  improve balance, coordination, kinesthetic sense, posture, core stability and proprioception to improve patient's ability to develop conscious control of individual muscles and awareness of position of extremities in order to progress to PLOF and address remaining functional goals. (see flow sheet as applicable)     Details if applicable:       12180 Manual Therapy (timed):  decrease pain, increase ROM, increase tissue extensibility, decrease edema, correct positional vertigo, decrease trigger points, and increase postural awareness to improve patient's ability to progress to PLOF and address remaining functional goals. The manual therapy interventions were performed at a separate and distinct time from the therapeutic activities interventions .  (see flow sheet as applicable)     Details if applicable:     10  52962 Therapeutic Activity (timed):

## 2023-04-27 ENCOUNTER — TELEPHONE (OUTPATIENT)
Facility: HOSPITAL | Age: 28
End: 2023-04-27

## 2023-04-27 NOTE — TELEPHONE ENCOUNTER
patient states her son's school is getting let out early and needs an earlier appt or needs to cancel.

## 2023-04-28 ENCOUNTER — HOSPITAL ENCOUNTER (OUTPATIENT)
Facility: HOSPITAL | Age: 28
Setting detail: RECURRING SERIES
Discharge: HOME | End: 2023-05-01
Payer: COMMERCIAL

## 2023-04-28 ENCOUNTER — APPOINTMENT (OUTPATIENT)
Facility: HOSPITAL | Age: 28
End: 2023-04-28
Payer: COMMERCIAL

## 2023-04-28 PROCEDURE — 97112 NEUROMUSCULAR REEDUCATION: CPT

## 2023-04-28 PROCEDURE — 97110 THERAPEUTIC EXERCISES: CPT

## 2023-04-28 PROCEDURE — 97530 THERAPEUTIC ACTIVITIES: CPT

## 2023-05-01 ENCOUNTER — HOSPITAL ENCOUNTER (OUTPATIENT)
Facility: HOSPITAL | Age: 28
Setting detail: RECURRING SERIES
Discharge: HOME | End: 2023-05-04
Payer: COMMERCIAL

## 2023-05-01 PROCEDURE — 97112 NEUROMUSCULAR REEDUCATION: CPT

## 2023-05-01 PROCEDURE — 97110 THERAPEUTIC EXERCISES: CPT

## 2023-05-01 NOTE — PROGRESS NOTES
PHYSICAL / OCCUPATIONAL THERAPY - DAILY TREATMENT NOTE (updated )    Patient Name: Glen Notice    Date: 2023    : 1995  Insurance: Payor: Enma Pierce / Plan: Gen Fitzpatrick / Product Type: *No Product type* /      Patient  verified Yes     Visit #   Current / Total 5 16   Time   In / Out 900 940   Pain   In / Out 1    Subjective Functional Status/Changes: Reports R low back pain since last visit. Changes to:  Meds, Allergies, Med Hx, Sx Hx? If yes, update Summary List yes       TREATMENT AREA =  Other low back pain [M54.59]    OBJECTIVE    Therapeutic Procedures: Tx Min Billable or 1:1 Min (if diff from Tx Min) Procedure, Rationale, Specifics   30  32264 Therapeutic Exercise (timed):  increase ROM, strength, coordination, balance, and proprioception to improve patient's ability to progress to PLOF and address remaining functional goals. (see flow sheet as applicable)     Details if applicable:       10  42026 Neuromuscular Re-Education (timed):  improve balance, coordination, kinesthetic sense, posture, core stability and proprioception to improve patient's ability to develop conscious control of individual muscles and awareness of position of extremities in order to progress to PLOF and address remaining functional goals. (see flow sheet as applicable)     Details if applicable:       80710 Manual Therapy (timed):  decrease pain, increase ROM, increase tissue extensibility, decrease edema, correct positional vertigo, decrease trigger points, and increase postural awareness to improve patient's ability to progress to PLOF and address remaining functional goals. The manual therapy interventions were performed at a separate and distinct time from the therapeutic activities interventions .  (see flow sheet as applicable)     Details if applicable:       34172 Therapeutic Activity (timed):  use of dynamic activities replicating functional movements to increase ROM, strength,

## 2023-05-05 ENCOUNTER — HOSPITAL ENCOUNTER (OUTPATIENT)
Facility: HOSPITAL | Age: 28
Setting detail: RECURRING SERIES
Discharge: HOME | End: 2023-05-08
Payer: COMMERCIAL

## 2023-05-05 PROCEDURE — 97110 THERAPEUTIC EXERCISES: CPT

## 2023-05-05 PROCEDURE — 97112 NEUROMUSCULAR REEDUCATION: CPT

## 2023-05-10 ENCOUNTER — HOSPITAL ENCOUNTER (OUTPATIENT)
Facility: HOSPITAL | Age: 28
Setting detail: RECURRING SERIES
Discharge: HOME | End: 2023-05-13
Payer: COMMERCIAL

## 2023-05-10 PROCEDURE — 97112 NEUROMUSCULAR REEDUCATION: CPT

## 2023-05-10 PROCEDURE — 97110 THERAPEUTIC EXERCISES: CPT

## 2023-05-10 PROCEDURE — 97140 MANUAL THERAPY 1/> REGIONS: CPT

## 2023-05-10 NOTE — PROGRESS NOTES
address ROM deficits, analyze and address strength deficits, analyze and address soft tissue restrictions, analyze and cue for proper movement patterns, analyze and modify for postural abnormalities, analyze and address imbalance/dizziness, and instruct in home and community integration to address functional deficits and attain remaining goals. Progress toward goals / Updated goals:  []  See Progress Note/Recertification  Progressing with functional tools to help mitigate sx when they arise. 5/5/23  Short Term Goals: To be accomplished in 4 weeks  Independent with HEP to progress to meet goals. - Compliant with HEP - 4/24/23  2. Pt to report decreased max pain levels less than 6/10 for improvement in ADLs. 5/1/23 - Goal Met - 3-4/10 reported  Long Term Goals: To be accomplished in 8 weeks  Improve FOTO score to 76/100 to show a significant functional change. 2. Pt to report decreased max pain levels less than 2/10 for improvement in QoL.   3. Pt to report standing 6 hours at work without increase in pain level to resume work activities  PLAN  Yes  Continue plan of care  [x]  Upgrade activities as tolerated  []  Discharge due to :  []  Other:    Alexa Calderon, SAMY    5/10/2023    2:09 PM    Future Appointments   Date Time Provider Reggie Mota   5/16/2023  3:00 PM Antonio Riley, PT [facility]   5/19/2023  9:00 AM [facility]   5/22/2023  9:00 AM [facility] [address]   5/26/2023  9:00 AM [facility] [address]   6/20/2023 11:30 AM Anastasia Oquendo MD [facility] AMB

## 2023-05-11 ENCOUNTER — TELEPHONE (OUTPATIENT)
Age: 28
End: 2023-05-11

## 2023-05-11 ENCOUNTER — TELEMEDICINE (OUTPATIENT)
Age: 28
End: 2023-05-11
Payer: COMMERCIAL

## 2023-05-11 DIAGNOSIS — M54.50 ACUTE RIGHT-SIDED LOW BACK PAIN WITHOUT SCIATICA: Primary | ICD-10-CM

## 2023-05-11 PROCEDURE — 99441 PR PHYS/QHP TELEPHONE EVALUATION 5-10 MIN: CPT | Performed by: PHYSICAL MEDICINE & REHABILITATION

## 2023-05-11 NOTE — PROGRESS NOTES
[facility]  [address], [address] [facility], [facility] [address]  Phone: [phone]  Fax: [phone]      Patient: Alma Hardy                                                                              MRN: [id number]        YOB: 1995          AGE: 25 y.o. PCP: MIKE Carey - NP  Date:  05/11/23    Reason for Consultation: No chief complaint on file. HPI:  Alma Hardy is a 25 y.o. female with relevant PMH of hypothyroid, HLD who presents with right sided low back pain. The first episode was in February 2022 when she had one week of low back pain, they symptoms resolved but one year later Feb 2023- pain returned and it has not resolved over the past few months. She started PT 4/2023 which has helped with flexibility but she continues to have pain. Neurologic symptoms: No numbness, tingling, weakness, bowel or bladder changes. No recent falls      Location: The pain is located in the right low back pain   Radiation: The pain does not radiate to the right hip. Pain Score: Currently: 2/10    Quality: Pain is of a cramping, stiff quality. Aggravating: Pain is exacerbated by standing  Alleviating:  The pain is alleviated by  stretches    Prior Treatments:  Physical therapy: Yes- 4/17/2023-ongoing  Injections:No  Surgery:No  Previous Medications: cyclobenzaprine- too drowsy, medrol pack  Current Medications:motrin  Previous work-up has included:   the actual images of the studies below were reviewed, visualized and interpreted by me.     2/21/2023 X-ray lumbar spine- L5 with bilateral sacralization    Past Medical History:   Past Medical History:   Diagnosis Date    Depression     Hypothyroid       Past Surgical History:   Past Surgical History:   Procedure Laterality Date    COLONOSCOPY        SocHx:   Social History     Tobacco Use    Smoking status: Never    Smokeless tobacco: Never   Substance Use Topics

## 2023-05-11 NOTE — TELEPHONE ENCOUNTER
Patient called for Christina Mojica. Patient said that she is still experiencing pain in her Lower Back. That nothing has changed. That she has had three to four weeks of physical therapy , and the pain is still the same. Patient is asking if Christina Mojica could order an Mri of her Back. Patient would like to have the mri done in the [place] area. Patient tel. [phone]. Note :patient last seen on 4/19/23. Next appt on 6/20/23.

## 2023-05-16 ENCOUNTER — HOSPITAL ENCOUNTER (OUTPATIENT)
Facility: HOSPITAL | Age: 28
Setting detail: RECURRING SERIES
Discharge: HOME | End: 2023-05-19
Payer: COMMERCIAL

## 2023-05-16 DIAGNOSIS — M54.50 ACUTE RIGHT-SIDED LOW BACK PAIN WITHOUT SCIATICA: ICD-10-CM

## 2023-05-16 PROCEDURE — 97140 MANUAL THERAPY 1/> REGIONS: CPT

## 2023-05-16 PROCEDURE — G0283 ELEC STIM OTHER THAN WOUND: HCPCS

## 2023-05-16 NOTE — PROGRESS NOTES
PHYSICAL / OCCUPATIONAL THERAPY - DAILY TREATMENT NOTE (updated )    Patient Name: Kayleen Fam    Date: 2023    : 1995  Insurance: Payor: Leatha Cisner[address] / Plan: Salinas Baldwin / Product Type: *No Product type* /      Patient  verified Yes     Visit #   Current / Total 8 16   Time   In / Out 3:00 3:45   Pain   In / Out 0 0   Subjective Functional Status/Changes: Notes over the weekend, she had more pain and pulsing in the R SIJ. I did the stretches that I was taught, but it just kept coming back. I took the medication which helped for a bit. Changes to:  Meds, Allergies, Med Hx, Sx Hx? If yes, update Summary List yes       TREATMENT AREA =  Other low back pain [M54.59]    OBJECTIVE  Modalities Rationale:     decrease inflammation and decrease pain to improve patient's ability to progress to PLOF and address remaining functional goals. 10 min [x] Estim Unattended, type/location:  IFC lower lumbar/SIJ                                    []  w/ice    [x]  w/heat    min [] Estim Attended, type/location:                                     []  w/US     []  w/ice    []  w/heat    []  TENS insruct      min []  Mechanical Traction: type/lbs                   []  pro   []  sup   []  int   []  cont    []  before manual    []  after manual    min []  Ultrasound, settings/location:      min []  Iontophoresis w/ dexamethasone, location:                                               []  take home patch       []  in clinic    min  unbill []  Ice     []  Heat    location/position:     min []  Paraffin,  details:     min []  Vasopneumatic Device, press/temp:     min []  Jacob Jacob / Curamiee Bless:     If using vaso (only need to measure limb vaso being performed on)      pre-treatment girth :       post-treatment girth :       measured at (landmark location) :      min []  Other:    Skin assessment post-treatment (if applicable):    [x]  intact    []  redness- no adverse reaction                 []redness -

## 2023-05-19 ENCOUNTER — HOSPITAL ENCOUNTER (OUTPATIENT)
Facility: HOSPITAL | Age: 28
Setting detail: RECURRING SERIES
Discharge: HOME | End: 2023-05-22
Payer: COMMERCIAL

## 2023-05-19 PROCEDURE — 97530 THERAPEUTIC ACTIVITIES: CPT

## 2023-05-19 PROCEDURE — 97110 THERAPEUTIC EXERCISES: CPT

## 2023-05-22 ENCOUNTER — HOSPITAL ENCOUNTER (OUTPATIENT)
Facility: HOSPITAL | Age: 28
Setting detail: RECURRING SERIES
Discharge: HOME | End: 2023-05-25
Payer: COMMERCIAL

## 2023-05-22 PROCEDURE — 97140 MANUAL THERAPY 1/> REGIONS: CPT

## 2023-05-22 PROCEDURE — 97110 THERAPEUTIC EXERCISES: CPT

## 2023-05-22 NOTE — PROGRESS NOTES
[]redness - adverse reaction:        Therapeutic Procedures: Tx Min Billable or 1:1 Min (if diff from Tx Min) Procedure, Rationale, Specifics   25  07439 Therapeutic Exercise (timed):  increase ROM, strength, coordination, balance, and proprioception to improve patient's ability to progress to PLOF and address remaining functional goals. (see flow sheet as applicable)     Details if applicable:         21778 Neuromuscular Re-Education (timed):  improve balance, coordination, kinesthetic sense, posture, core stability and proprioception to improve patient's ability to develop conscious control of individual muscles and awareness of position of extremities in order to progress to PLOF and address remaining functional goals. (see flow sheet as applicable)     Details if applicable:     15  66110 Manual Therapy (timed):  decrease pain, increase ROM, increase tissue extensibility, decrease edema, correct positional vertigo, decrease trigger points, and increase postural awareness to improve patient's ability to progress to PLOF and address remaining functional goals. The manual therapy interventions were performed at a separate and distinct time from the therapeutic activities interventions . (see flow sheet as applicable)     Details if applicable:  STM: R QL, R t/s, R l/s, R inferior boarder of scapulae     80685 Therapeutic Activity (timed):  use of dynamic activities replicating functional movements to increase ROM, strength, coordination, balance, and proprioception in order to improve patient's ability to progress to PLOF and address remaining functional goals.   (see flow sheet as applicable)     Details if applicable:       90505 Self Care/Home Management (timed):  improve patient knowledge and understanding of pain reducing techniques, positioning, posture/ergonomics, home safety, activity modification, diagnosis/prognosis, and physical therapy expectations, procedures and progression  to improve patient's

## 2023-05-22 NOTE — THERAPY RECERTIFICATION
[facility]  [address], [address],[place], [address] - Ph: [phone]  Fx: [phone]  PHYSICAL THERAPY PROGRESS NOTE  Patient Name: Manuel Joe : 1995   Treatment/Medical Diagnosis: Other low back pain [M54.59]   Referral Source: MIKE Arnett *     Date of Initial Visit: 23 Attended Visits: 10 Missed Visits: 0     SUMMARY OF TREATMENT  Pt was seen for IE and 9 f/u visits with treatment consisting of therapeutic exercise, therapeutic activity, neuromuscular activity, manual therapy, activity modification/progression, and Pt ED to improve functional strength, mobility and endurance along with instruction of HEP. CURRENT STATUS  Pt has made good progress in PT as demonstrated by decreased max pain levels at 2/10 and average pain level of 0/10. Pt reports 20% improvement since beginning therapy. Pt also notes that she continues to have some pain after standing for 6 or more hours, however reports it is not as prominent since starting PT. Sarina Moise Pt notes they are completing their HEP as prescribed. Pt will continue to benefit from skilled PT to progress exercises and improve upon functional strengthen and mobility. Independent with HEP to progress to meet goals  Status at last Eval: initiated  Current Status: patient reporting compliance and HEP is ongoing  Goal Met?  yes    2. Pt to report decreased max pain levels less than 6/10 for improvement in ADLs. Status at last Eval: 10/10  Current Status: 2/10  Goal Met?  yes      Payor: Refugio Nugent / Plan: John Solitario / Product Type: *No Product type* /     Non-Medicare, can change goals, can adjust or add frequency duration, no signature required      New Goals to be achieved in 4 weeks  1. Improve FOTO score to 76/100 to show a significant functional change. 2. Pt to report decreased max pain levels less than 2/10 for improvement in QoL.   3. Pt to report

## 2023-05-26 ENCOUNTER — HOSPITAL ENCOUNTER (OUTPATIENT)
Facility: HOSPITAL | Age: 28
Setting detail: RECURRING SERIES
Discharge: HOME | End: 2023-05-29
Payer: COMMERCIAL

## 2023-05-26 PROCEDURE — 97110 THERAPEUTIC EXERCISES: CPT

## 2023-05-26 PROCEDURE — 97112 NEUROMUSCULAR REEDUCATION: CPT

## 2023-05-26 PROCEDURE — 97140 MANUAL THERAPY 1/> REGIONS: CPT

## 2023-06-07 ENCOUNTER — HOSPITAL ENCOUNTER (OUTPATIENT)
Facility: HOSPITAL | Age: 28
Setting detail: RECURRING SERIES
Discharge: HOME | End: 2023-06-10
Payer: COMMERCIAL

## 2023-06-07 PROCEDURE — 97110 THERAPEUTIC EXERCISES: CPT

## 2023-06-07 PROCEDURE — 97112 NEUROMUSCULAR REEDUCATION: CPT

## 2023-06-07 PROCEDURE — 97530 THERAPEUTIC ACTIVITIES: CPT

## 2023-06-07 PROCEDURE — 97535 SELF CARE MNGMENT TRAINING: CPT

## 2023-06-07 NOTE — PROGRESS NOTES
PHYSICAL / OCCUPATIONAL THERAPY - DAILY TREATMENT NOTE (updated )    Patient Name: Agnes Kent    Date: 2023    : 1995  Insurance: Payor: Ana Hayden / Plan: Mario Sparta / Product Type: *No Product type* /      Patient  verified Yes     Visit #   Current / Total 12 16   Time   In / Out 10:21 10:59   Pain   In / Out 0 0-1   Subjective Functional Status/Changes: Notes that e back doesn't hurt as much when she stands at work. Notices a little pinch right now on the R that comes and goes. OBGYN told her she didn't need to come in because it wasn't that concerning. Changes to:  Meds, Allergies, Med Hx, Sx Hx? If yes, update Summary List yes       TREATMENT AREA =  Other low back pain [M54.59]    OBJECTIVE  Modalities Rationale:     decrease inflammation and decrease pain to improve patient's ability to progress to PLOF and address remaining functional goals. min [x] Estim Unattended, type/location:  IFC lower lumbar/SIJ                                    []  w/ice    [x]  w/heat    min [] Estim Attended, type/location:                                     []  w/US     []  w/ice    []  w/heat    []  TENS insruct      min []  Mechanical Traction: type/lbs                   []  pro   []  sup   []  int   []  cont    []  before manual    []  after manual    min []  Ultrasound, settings/location:      min []  Iontophoresis w/ dexamethasone, location:                                               []  take home patch       []  in clinic    min  unbill []  Ice     []  Heat    location/position:     min []  Paraffin,  details:     min []  Vasopneumatic Device, press/temp:     min []  Jonny Paniagua / Jennifer Banerjee:     If using vaso (only need to measure limb vaso being performed on)      pre-treatment girth :       post-treatment girth :       measured at (landmark location) :      min []  Other:    Skin assessment post-treatment (if applicable):    [x]  intact    []  redness- no adverse reaction

## 2023-06-20 ENCOUNTER — OFFICE VISIT (OUTPATIENT)
Age: 28
End: 2023-06-20
Payer: COMMERCIAL

## 2023-06-20 VITALS
HEART RATE: 78 BPM | OXYGEN SATURATION: 97 % | HEIGHT: 62 IN | BODY MASS INDEX: 36.62 KG/M2 | TEMPERATURE: 98 F | WEIGHT: 199 LBS

## 2023-06-20 DIAGNOSIS — M54.50 ACUTE RIGHT-SIDED LOW BACK PAIN WITHOUT SCIATICA: Primary | ICD-10-CM

## 2023-06-20 PROCEDURE — 99214 OFFICE O/P EST MOD 30 MIN: CPT | Performed by: PHYSICAL MEDICINE & REHABILITATION

## 2023-06-20 ASSESSMENT — PATIENT HEALTH QUESTIONNAIRE - PHQ9
SUM OF ALL RESPONSES TO PHQ QUESTIONS 1-9: 0
1. LITTLE INTEREST OR PLEASURE IN DOING THINGS: 0
SUM OF ALL RESPONSES TO PHQ9 QUESTIONS 1 & 2: 0
SUM OF ALL RESPONSES TO PHQ QUESTIONS 1-9: 0
2. FEELING DOWN, DEPRESSED OR HOPELESS: 0
SUM OF ALL RESPONSES TO PHQ QUESTIONS 1-9: 0
SUM OF ALL RESPONSES TO PHQ QUESTIONS 1-9: 0

## 2023-06-20 NOTE — PROGRESS NOTES
Valery Manuel presents today for   Chief Complaint   Patient presents with    Back Pain       Is someone accompanying this pt? no    Is the patient using any DME equipment during OV? no    Depression Screening:  No flowsheet data found. Learning Assessment:  No flowsheet data found. Abuse Screening:  No flowsheet data found. Fall Risk  No flowsheet data found. OPIOID RISK TOOL  No flowsheet data found. Coordination of Care:  1. Have you been to the ER, urgent care clinic since your last visit? Yes April 2023 for her back  Hospitalized since your last visit? no    2. Have you seen or consulted any other health care providers outside of the [address] [place] [address] since your last visit? no Include any pap smears or colon screening.  no

## 2023-06-20 NOTE — PROGRESS NOTES
[facility]  [address]  Phone: [phone]  Fax: [phone]      Patient: Nadia Rosario                                                                              MRN: [id number]        YOB: 1995          AGE: 25 y.o. PCP: MIKE An - NP  Date:  06/20/23    Reason for Consultation: Back Pain      HPI:  Nadia Rosario is a 25 y.o. female with relevant PMH of hypothyroid, HLD who presented with right sided low back pain. The first episode was in February 2022 when she had one week of low back pain, they symptoms resolved but one year later Feb 2023- pain returned and it has not resolved over the past few months. She started PT 4/2023 which has helped with flexibility but she continues to have pain. She has not  been contacted about the MRI yet. Neurologic symptoms: No numbness, tingling, weakness, bowel or bladder changes. No recent falls      Location: The pain is located in the right low back pain   Radiation: The pain does not radiate to the right hip. Pain Score: Currently: 2/10  at worst 5/10  Quality: Pain is of a cramping, stiff quality. Aggravating: Pain is exacerbated by standing  Alleviating:  The pain is alleviated by  stretches    Prior Treatments:  Physical therapy: Yes- 4/17/2023-ongoing  Injections:No  Surgery:No  Previous Medications: cyclobenzaprine- too drowsy, medrol pack  Current Medications:motrin  Previous work-up has included:   the actual images of the studies below were reviewed, visualized and interpreted by me.     2/21/2023 X-ray lumbar spine- L5 with bilateral sacralization    Past Medical History:   Past Medical History:   Diagnosis Date    Depression     Hypothyroid       Past Surgical History:   Past Surgical History:   Procedure Laterality Date    COLONOSCOPY        SocHx:   Social History     Tobacco Use    Smoking status: Never

## 2023-07-27 ENCOUNTER — OFFICE VISIT (OUTPATIENT)
Age: 28
End: 2023-07-27
Payer: COMMERCIAL

## 2023-07-27 ENCOUNTER — TELEPHONE (OUTPATIENT)
Facility: CLINIC | Age: 28
End: 2023-07-27

## 2023-07-27 VITALS — OXYGEN SATURATION: 100 % | HEIGHT: 62 IN | BODY MASS INDEX: 38.09 KG/M2 | WEIGHT: 207 LBS | TEMPERATURE: 98 F

## 2023-07-27 DIAGNOSIS — M54.50 ACUTE RIGHT-SIDED LOW BACK PAIN WITHOUT SCIATICA: Primary | ICD-10-CM

## 2023-07-27 PROCEDURE — 99213 OFFICE O/P EST LOW 20 MIN: CPT | Performed by: PHYSICAL MEDICINE & REHABILITATION

## 2023-07-27 ASSESSMENT — PATIENT HEALTH QUESTIONNAIRE - PHQ9
SUM OF ALL RESPONSES TO PHQ QUESTIONS 1-9: 0
SUM OF ALL RESPONSES TO PHQ QUESTIONS 1-9: 0
2. FEELING DOWN, DEPRESSED OR HOPELESS: 0
SUM OF ALL RESPONSES TO PHQ QUESTIONS 1-9: 0
SUM OF ALL RESPONSES TO PHQ9 QUESTIONS 1 & 2: 0
1. LITTLE INTEREST OR PLEASURE IN DOING THINGS: 0
SUM OF ALL RESPONSES TO PHQ QUESTIONS 1-9: 0

## 2023-07-27 NOTE — TELEPHONE ENCOUNTER
----- Message from Rah Chen sent at 7/27/2023  3:08 PM EDT -----  Subject: Referral Request    Reason for referral request? Pt is needing labs done for her thyroid. Provider patient wants to be referred to(if known):     Provider Phone Number(if known):     Additional Information for Provider?   ---------------------------------------------------------------------------  --------------  [address]    [phone]; OK to leave message on voicemail  ---------------------------------------------------------------------------  --------------

## 2023-07-27 NOTE — PROGRESS NOTES
Alba Arreola presents today for   Chief Complaint   Patient presents with    Back Pain       Is someone accompanying this pt? no    Is the patient using any DME equipment during OV? no    Depression Screening:  No flowsheet data found. Learning Assessment:  No flowsheet data found. Abuse Screening:  No flowsheet data found. Fall Risk  No flowsheet data found. OPIOID RISK TOOL  No flowsheet data found. Coordination of Care:  1. Have you been to the ER, urgent care clinic since your last visit? no  Hospitalized since your last visit? no    2. Have you seen or consulted any other health care providers outside of the [address] [place] [address] since your last visit? no Include any pap smears or colon screening.  no

## 2023-07-27 NOTE — PROGRESS NOTES
[facility]  [address] [place] [address], [address] [facility], [address]   Phone: [phone]  Fax: [phone]      Patient: Conor Durand                                                                              MRN: [id number]        YOB: 1995          AGE: 25 y.o. PCP: Kathryne Curling, APRN - NP  Date:  07/27/23    Reason for Consultation: Back Pain      HPI:  Conor Durand is a 25 y.o. female with relevant PMH of hypothyroid, HLD who presented with right sided low back pain. The first episode was in February 2022 when she had one week of low back pain, they symptoms resolved but one year later Feb 2023- pain returned and it has not resolved over the past few months. She started PT 4/2023 which has helped with flexibility but she continued to have pain. We got an MRI of her lumbar spine 7/12/23 which demonstrates L2-3 mild right paracentral disc protrusion with annular tear and small L4/5 disc bulge mildly narrowing left neural foramen      Neurologic symptoms: No numbness, tingling, weakness, bowel or bladder changes. No recent falls      Location: The pain is located in the right low back pain   Radiation: The pain does not radiate to the right hip. Pain Score: Currently: 1/10  at worst 5/10  Quality: Pain is of a cramping, stiff quality. Aggravating: Pain is exacerbated by standing  Alleviating: The pain is alleviated by  stretches    Prior Treatments:  Physical therapy: Yes- 4/17/2023-ongoing  Injections:No  Surgery:No  Previous Medications: cyclobenzaprine- too drowsy, medrol pack  Current Medications:motrin  Previous work-up has included:   the actual images of the studies below were reviewed, visualized and interpreted by me.     2/21/2023 X-ray lumbar spine- L5 with bilateral sacralization  MRI lumbar spine 7/12/23  -L1-2: No significant disc pathology. No spinal canal or foraminal stenosis.      -L2-3: Mild right

## 2023-07-31 NOTE — TELEPHONE ENCOUNTER
Tried calling patient in regards to labs appointment and to reschedule her appointment today due to provider being out of office, no answer; left voicemail for patient to call office back.

## 2023-08-16 DIAGNOSIS — E78.1 HYPERTRIGLYCERIDEMIA: ICD-10-CM

## 2023-08-22 RX ORDER — FENOFIBRATE 48 MG/1
TABLET, COATED ORAL
Qty: 90 TABLET | Refills: 1 | Status: SHIPPED | OUTPATIENT
Start: 2023-08-22

## 2023-08-29 ENCOUNTER — OFFICE VISIT (OUTPATIENT)
Facility: CLINIC | Age: 28
End: 2023-08-29
Payer: COMMERCIAL

## 2023-08-29 VITALS
RESPIRATION RATE: 18 BRPM | DIASTOLIC BLOOD PRESSURE: 75 MMHG | HEART RATE: 73 BPM | BODY MASS INDEX: 37.39 KG/M2 | WEIGHT: 203.2 LBS | HEIGHT: 62 IN | OXYGEN SATURATION: 98 % | SYSTOLIC BLOOD PRESSURE: 111 MMHG | TEMPERATURE: 98.2 F

## 2023-08-29 DIAGNOSIS — E03.9 HYPOTHYROIDISM, UNSPECIFIED TYPE: ICD-10-CM

## 2023-08-29 DIAGNOSIS — N92.6 IRREGULAR MENSTRUATION, UNSPECIFIED: ICD-10-CM

## 2023-08-29 DIAGNOSIS — Z83.3 FAMILY HISTORY OF DIABETES MELLITUS: ICD-10-CM

## 2023-08-29 DIAGNOSIS — M54.16 LUMBAR RADICULOPATHY: Primary | ICD-10-CM

## 2023-08-29 DIAGNOSIS — R87.618 OTHER ABNORMAL CYTOLOGICAL FINDING OF SPECIMEN FROM CERVIX: ICD-10-CM

## 2023-08-29 DIAGNOSIS — E78.1 HYPERTRIGLYCERIDEMIA: ICD-10-CM

## 2023-08-29 PROCEDURE — 99214 OFFICE O/P EST MOD 30 MIN: CPT | Performed by: NURSE PRACTITIONER

## 2023-08-29 RX ORDER — LEVONORGESTREL AND ETHINYL ESTRADIOL 0.15-0.03
1 KIT ORAL DAILY
Qty: 3 PACKET | Refills: 2 | Status: SHIPPED | OUTPATIENT
Start: 2023-08-29

## 2023-08-29 ASSESSMENT — PATIENT HEALTH QUESTIONNAIRE - PHQ9
SUM OF ALL RESPONSES TO PHQ9 QUESTIONS 1 & 2: 1
2. FEELING DOWN, DEPRESSED OR HOPELESS: 1
SUM OF ALL RESPONSES TO PHQ QUESTIONS 1-9: 1
1. LITTLE INTEREST OR PLEASURE IN DOING THINGS: 0
SUM OF ALL RESPONSES TO PHQ QUESTIONS 1-9: 1

## 2023-08-29 NOTE — PROGRESS NOTES
Patient states her last menstrual cycle was July     Francisco Ross presents today for   Chief Complaint   Patient presents with    Follow-up    Discuss Labs    Medication Refill       Is someone accompanying this pt? no    Is the patient using any DME equipment during OV? No     Depression Screening:  No flowsheet data found. Learning Assessment:  No flowsheet data found. Health Maintenance reviewed and discussed and ordered per Provider. Health Maintenance Due   Topic Date Due    COVID-19 Vaccine (4 - Booster for Moderna series) 07/10/2021    Flu vaccine (1) 08/01/2023   . Coordination of Care:  1. Have you been to the ER, urgent care clinic since your last visit? Hospitalized since your last visit? No     2. Have you seen or consulted any other health care providers outside of the [address] [place] [address] since your last visit? Include any pap smears or colon screening. No     3. For patients aged 43-73: Has the patient had a colonoscopy / FIT/ Cologuard? Yes      If the patient is female:    4. For patients aged 43-66: Has the patient had a mammogram within the past 2 years? N/a      5. For patients aged 21-65: Has the patient had a pap smear?  Yes

## 2023-08-29 NOTE — PROGRESS NOTES
Susan Rodriguez is a 25 y.o. female and presents with Follow-up, Discuss Labs, and Medication Refill       Assessment/Plan:    1. Lumbar radiculopathy  Comments:  seeing Dr. Duyen Angela  continue PT  -Medications - meloxicam 15mg with food prn daily  2. Irregular menstruation, unspecified  Comments:  on oral contraceptives  Orders:  -     levonorgestrel-ethinyl estradiol (NORDETTE) 0.15-30 MG-MCG per tablet; Take 1 tablet by mouth daily, Disp-3 packet, R-2Normal  3. Other abnormal cytological finding of specimen from cervix  Comments:  last one 12/2022; yearly PAP at OB/GYN  4. Hypertriglyceridemia  -     Lipid Panel; Future  5. Hypothyroidism, unspecified type  -     TSH; Future  6. Family history of diabetes mellitus  -     Comprehensive Metabolic Panel; Future  -     Hemoglobin A1C; Future         Follow up and disposition:   Return in about 2 days (around 8/31/2023) for fasting lab and then 2 week lab review. Subjective:    Labs obtained prior to visit? No  Reviewed with patient? N/A    [facility] 7-2023  --irregular menstruation  --not sexually active     Recently converted to Hinduism Judaism     ROS:     Review of Systems   Constitutional: Negative. Negative for chills, fatigue and fever. HENT: Negative. Respiratory: Negative. Negative for chest tightness, shortness of breath and wheezing. Cardiovascular: Negative. Negative for chest pain, palpitations and leg swelling. Gastrointestinal: Negative. Genitourinary: Negative. Negative for dysuria. Musculoskeletal:  Positive for back pain. Negative for gait problem and joint swelling. Neurological: Negative. Negative for dizziness, numbness and headaches. Psychiatric/Behavioral: Negative. Negative for dysphoric mood. The patient is not nervous/anxious. The problem list was updated as a part of today's visit.   Patient Active Problem List   Diagnosis    Hypothyroidism    Anxiety disorder    Hypercholesterolemia    Depression    Severe

## 2023-08-30 ASSESSMENT — ENCOUNTER SYMPTOMS
BACK PAIN: 1
WHEEZING: 0
SHORTNESS OF BREATH: 0
GASTROINTESTINAL NEGATIVE: 1
CHEST TIGHTNESS: 0
RESPIRATORY NEGATIVE: 1

## 2023-09-22 ENCOUNTER — TELEPHONE (OUTPATIENT)
Age: 28
End: 2023-09-22

## 2023-09-22 NOTE — TELEPHONE ENCOUNTER
Patient was told to call if any changes in her back pain. She reports taking Meloxicam as recommended. When initially seen for her back, pain was on lower right side. She now has a new pain in her lumbar area that started off like cramping but has now progressed into a localized pain in her mid back on her spine. She has discomfort when sitting, standing or bending. She's anxious to see if we have any recommendations for her and she's aware her doctor is out of the office today. Please advise patient at [phone].

## 2023-09-26 ENCOUNTER — TELEPHONE (OUTPATIENT)
Facility: CLINIC | Age: 28
End: 2023-09-26

## 2023-09-26 NOTE — TELEPHONE ENCOUNTER
Pt requesting new referral to PT, states that last referral order is out of time frame to schedule an appt at the office. Please advise.

## 2023-09-26 NOTE — TELEPHONE ENCOUNTER
Patient was referred to physical therapy 7-2023 by Dr. Fred Currie. Advised patient to call Dr. Maxi Prado office. Also, advised patient that as of October 1, 2023, her insurance will be out of network so patient will either need to switch Medicaid.

## 2023-09-27 NOTE — TELEPHONE ENCOUNTER
Attempted to contact the pt regarding her message. She was not able to be reached. A message was left for the pt asking her to contact the office at her convenience. The number to the office was provided. The pt can be offered a virtual appt with Dr. Rafi Horvath to discuss her new issue.

## 2023-10-02 DIAGNOSIS — F32.A DEPRESSION, UNSPECIFIED DEPRESSION TYPE: ICD-10-CM

## 2023-10-02 RX ORDER — ESCITALOPRAM OXALATE 10 MG/1
10 TABLET ORAL DAILY
Qty: 90 TABLET | Refills: 1 | Status: SHIPPED | OUTPATIENT
Start: 2023-10-02

## 2023-10-06 ENCOUNTER — TELEMEDICINE (OUTPATIENT)
Age: 28
End: 2023-10-06
Payer: COMMERCIAL

## 2023-10-06 DIAGNOSIS — M54.50 CHRONIC BILATERAL LOW BACK PAIN WITHOUT SCIATICA: Primary | ICD-10-CM

## 2023-10-06 DIAGNOSIS — G89.29 CHRONIC BILATERAL LOW BACK PAIN WITHOUT SCIATICA: Primary | ICD-10-CM

## 2023-10-06 PROCEDURE — 99441 PR PHYS/QHP TELEPHONE EVALUATION 5-10 MIN: CPT | Performed by: PHYSICAL MEDICINE & REHABILITATION

## 2023-10-06 NOTE — PROGRESS NOTES
significant disc pathology. No spinal canal or foraminal stenosis. -L2-3: Mild right paracentral disc protrusion with annular tear mildly narrowing right central canal. No spinal canal or foraminal stenosis. -L3-4: No significant disc pathology. No spinal canal or foraminal stenosis. -L4-5: Mild disc bulge with prominence in left foraminal region mildly narrowing left neural foramen. No spinal canal stenosis. -L5-S1: No significant disc pathology. No spinal canal or foraminal stenosis. Past Medical History:   Past Medical History:   Diagnosis Date    Depression     Hypothyroid       Past Surgical History:   Past Surgical History:   Procedure Laterality Date    COLONOSCOPY        SocHx:   Social History     Tobacco Use    Smoking status: Never    Smokeless tobacco: Never   Substance Use Topics    Alcohol use: Yes      FamHx:? Family History   Problem Relation Age of Onset    Hypertension Mother     High Cholesterol Mother        Current Medications:   Current Outpatient Medications   Medication Sig Dispense Refill    escitalopram (LEXAPRO) 10 MG tablet TAKE 1 TABLET BY MOUTH EVERY DAY 90 tablet 1    levonorgestrel-ethinyl estradiol (NORDETTE) 0.15-30 MG-MCG per tablet Take 1 tablet by mouth daily 3 packet 2    fenofibrate (TRICOR) 48 MG tablet TAKE 1 TABLET BY MOUTH EVERY DAY 90 tablet 1    levothyroxine (SYNTHROID) 100 MCG tablet Take 1 tablet by mouth every morning (before breakfast) 90 tablet 2    spironolactone (ALDACTONE) 100 MG tablet TAKE 1/2 TABLET BY MOUTH DAILY FOR 2 WEEKS THEN INCREASE TO 1 TAB DAILY AS TOLERATED (Patient not taking: Reported on 8/29/2023)       No current facility-administered medications for this visit. Assessment:   - discogenic low back pain    Plan:      -Physical therapy -  continue PT- new referral placed  -Medications - meloxicam 15mg with food prn daily , not to take with other NSAIDS and can then take prn  .  Counseled regarding side effects and

## 2023-10-16 ENCOUNTER — OFFICE VISIT (OUTPATIENT)
Facility: CLINIC | Age: 28
End: 2023-10-16
Payer: COMMERCIAL

## 2023-10-16 VITALS
WEIGHT: 195 LBS | SYSTOLIC BLOOD PRESSURE: 111 MMHG | OXYGEN SATURATION: 96 % | HEART RATE: 83 BPM | RESPIRATION RATE: 18 BRPM | BODY MASS INDEX: 35.88 KG/M2 | DIASTOLIC BLOOD PRESSURE: 72 MMHG | HEIGHT: 62 IN

## 2023-10-16 DIAGNOSIS — H60.392 OTHER INFECTIVE ACUTE OTITIS EXTERNA OF LEFT EAR: Primary | ICD-10-CM

## 2023-10-16 PROCEDURE — 99213 OFFICE O/P EST LOW 20 MIN: CPT | Performed by: FAMILY MEDICINE

## 2023-10-16 RX ORDER — IBUPROFEN 800 MG/1
800 TABLET ORAL EVERY 8 HOURS PRN
COMMUNITY

## 2023-10-16 RX ORDER — OFLOXACIN 3 MG/ML
5 SOLUTION AURICULAR (OTIC) DAILY
Qty: 5 ML | Refills: 0 | Status: SHIPPED | OUTPATIENT
Start: 2023-10-16 | End: 2023-10-23

## 2023-10-16 RX ORDER — AMOXICILLIN 500 MG/1
500 CAPSULE ORAL 3 TIMES DAILY
COMMUNITY

## 2023-10-16 ASSESSMENT — PATIENT HEALTH QUESTIONNAIRE - PHQ9
SUM OF ALL RESPONSES TO PHQ9 QUESTIONS 1 & 2: 0
SUM OF ALL RESPONSES TO PHQ QUESTIONS 1-9: 0
SUM OF ALL RESPONSES TO PHQ QUESTIONS 1-9: 0
2. FEELING DOWN, DEPRESSED OR HOPELESS: 0
SUM OF ALL RESPONSES TO PHQ QUESTIONS 1-9: 0
SUM OF ALL RESPONSES TO PHQ QUESTIONS 1-9: 0
1. LITTLE INTEREST OR PLEASURE IN DOING THINGS: 0

## 2023-10-16 ASSESSMENT — ENCOUNTER SYMPTOMS
SHORTNESS OF BREATH: 0
COUGH: 1
VOMITING: 0
NAUSEA: 0
DIARRHEA: 0
RHINORRHEA: 0

## 2023-10-16 NOTE — PATIENT INSTRUCTIONS
FYI: You may receive a patient survey; the provider rating is based on your encounter with me specifically and NOT the staff/facility. Looking forward to your comments. Patient Information     [facility] is dedicated to improving your health. We are excited to have you as a patient. To provide the best care, we need a good plan. To optimize your safety and care, we ask you to follow and be aware of some important policies and procedures. A. Arrive 20 minutes prior to your appointment. Arriving prior to your scheduled medical visit allows time to complete check- in paperwork prior to seeing the physician. B. Not showing-up for an appointment without letting your provider know leaves the practice in a difficult position and prevents other patients from being able to use the appointment slot. We are always trying to provide better access to our patients and this will help us in that goal.    C. If you are unable to keep an appointment, please call 24 hours before your appointment if at all possible. Another patient needing care might be served in the event you cannot make your appointment. D. Bring all of your medication bottles (except those that must be refrigerated) and supplements with you to your appointment. This enables the provider to accurately assess medication needs and make important changes as needed. E. Please seek to get your medications refilled during your scheduled appointments. This will decrease wait time for refills to be processed. When you bring all medications with you the day of your appointment, we will prescribe enough medications to last until your next appointment. Prescriptions cannot be filled after office hours, on weekends/holidays, or any other time the office is closed. F. If you have any forms to be completed, please mention that when making your appointment. Please send the forms to the office prior to your appointment.  If you cannot do that,

## 2023-10-16 NOTE — PROGRESS NOTES
[facility], [address] [place] [address]               [phone]        Gloria Aquino is a 25 y.o. female and presents with Otalgia (left)           Assessment/Plan:  Kirstin Gómez was seen today for otalgia. Diagnoses and all orders for this visit:    Other infective acute otitis externa of left ear  -     ofloxacin (FLOXIN) 0.3 % otic solution; Place 5 drops in ear(s) daily for 7 days      Has been on ciprodex in the future with irritation of her ear canal so she discontinued this; begin ofloxacin daily for 7 days; continue full course of amoxicillin; educated on how to apply the ear drops into the ear by tilting her head and pressing her tragus multiple times with each drop; advised to f/u in 1 week if still symptomatic; begin scheduled ibuprofen/tylenol; should improve within 48 hours      Follow up and disposition:   Return in about 1 week (around 10/23/2023), or if symptoms worsen or fail to improve, for follow up -15 min.       Health Maintenance:   Health Maintenance   Topic Date Due    Hepatitis B vaccine (1 of 3 - 3-dose series) Never done    COVID-19 Vaccine (4 - Moderna series) 07/10/2021    Flu vaccine (1) 08/01/2023    Cervical cancer screen  12/12/2023    Depression Monitoring  10/16/2024    DTaP/Tdap/Td vaccine (2 - Td or Tdap) 05/19/2026    HIV screen  Completed    Hepatitis A vaccine  Aged Out    Hib vaccine  Aged Out    HPV vaccine  Aged Out    Meningococcal (ACWY) vaccine  Aged Out    Pneumococcal 0-64 years Vaccine  Aged Out    Varicella vaccine  Discontinued    Hepatitis C screen  Discontinued        Subjective   24 y/o female here for ear pian  States Saturday she awakened with severe ear pain; gradually worsened so she went to [facility], diagnosed with swimmer's ear, given ear drops and amoxicillin; advised to take with ibuprofen; states she has been doing that but she had waves of pain every 3 hours; went back on Sunday; nurse that saw her started her on ibuprofen

## 2023-10-16 NOTE — PROGRESS NOTES
Wilfredo Chacon presents today for   Chief Complaint   Patient presents with    Otalgia     left       Is someone accompanying this pt? no    Is the patient using any DME equipment during OV? No     Depression Screening:       No data to display                Learning Assessment:  No flowsheet data found. Health Maintenance reviewed and discussed and ordered per Provider. Health Maintenance Due   Topic Date Due    Hepatitis B vaccine (1 of 3 - 3-dose series) Never done    COVID-19 Vaccine (4 - Moderna series) 07/10/2021    Flu vaccine (1) 08/01/2023   . Coordination of Care:  1. Have you been to the ER, urgent care clinic since your last visit? Hospitalized since your last visit? Yes patient 10/15 ear pain     2. Have you seen or consulted any other health care providers outside of the [address] [place] [address] since your last visit? Include any pap smears or colon screening. No     3. For patients aged 43-73: Has the patient had a colonoscopy / FIT/ Cologuard? N/a      If the patient is female:    4. For patients aged 43-66: Has the patient had a mammogram within the past 2 years? N/a      5. For patients aged 21-65: Has the patient had a pap smear?  Yes

## 2023-11-20 ENCOUNTER — TELEPHONE (OUTPATIENT)
Facility: CLINIC | Age: 28
End: 2023-11-20

## 2023-11-20 DIAGNOSIS — N92.6 IRREGULAR MENSTRUATION, UNSPECIFIED: ICD-10-CM

## 2023-11-20 RX ORDER — LEVONORGESTREL AND ETHINYL ESTRADIOL 0.15-0.03
1 KIT ORAL DAILY
Qty: 3 PACKET | Refills: 2 | Status: CANCELLED | OUTPATIENT
Start: 2023-11-20

## 2023-11-20 NOTE — TELEPHONE ENCOUNTER
Patient stated she is going to [facility] for an extended period of time and would like additional refills on all of her medications.

## 2024-01-19 ENCOUNTER — OFFICE VISIT (OUTPATIENT)
Facility: CLINIC | Age: 29
End: 2024-01-19
Payer: COMMERCIAL

## 2024-01-19 VITALS
TEMPERATURE: 98 F | HEART RATE: 86 BPM | BODY MASS INDEX: 38.35 KG/M2 | HEIGHT: 62 IN | OXYGEN SATURATION: 98 % | WEIGHT: 208.4 LBS | RESPIRATION RATE: 18 BRPM | DIASTOLIC BLOOD PRESSURE: 80 MMHG | SYSTOLIC BLOOD PRESSURE: 124 MMHG

## 2024-01-19 DIAGNOSIS — N92.6 IRREGULAR MENSTRUATION, UNSPECIFIED: Primary | ICD-10-CM

## 2024-01-19 DIAGNOSIS — N39.0 URINARY TRACT INFECTION WITHOUT HEMATURIA, SITE UNSPECIFIED: ICD-10-CM

## 2024-01-19 DIAGNOSIS — M54.2 NECK PAIN: ICD-10-CM

## 2024-01-19 DIAGNOSIS — E78.1 HYPERTRIGLYCERIDEMIA: ICD-10-CM

## 2024-01-19 DIAGNOSIS — M54.16 LUMBAR RADICULOPATHY: ICD-10-CM

## 2024-01-19 DIAGNOSIS — E03.9 HYPOTHYROIDISM, UNSPECIFIED TYPE: ICD-10-CM

## 2024-01-19 DIAGNOSIS — F32.A DEPRESSION, UNSPECIFIED DEPRESSION TYPE: ICD-10-CM

## 2024-01-19 DIAGNOSIS — Q76.49 TRANSITIONAL VERTEBRA: ICD-10-CM

## 2024-01-19 PROCEDURE — 99213 OFFICE O/P EST LOW 20 MIN: CPT | Performed by: NURSE PRACTITIONER

## 2024-01-19 RX ORDER — CYCLOBENZAPRINE HCL 10 MG
10 TABLET ORAL 3 TIMES DAILY PRN
COMMUNITY
Start: 2024-01-08

## 2024-01-19 RX ORDER — NAPROXEN 500 MG/1
500 TABLET ORAL 2 TIMES DAILY
COMMUNITY
Start: 2024-01-19

## 2024-01-19 RX ORDER — FENOFIBRATE 48 MG/1
48 TABLET, COATED ORAL DAILY
Qty: 90 TABLET | Refills: 1 | Status: SHIPPED | OUTPATIENT
Start: 2024-01-19

## 2024-01-19 RX ORDER — METHOCARBAMOL 500 MG/1
500 TABLET, FILM COATED ORAL 4 TIMES DAILY
COMMUNITY
Start: 2024-01-19

## 2024-01-19 RX ORDER — CEPHALEXIN 500 MG/1
500 CAPSULE ORAL EVERY 12 HOURS
COMMUNITY
Start: 2024-01-19 | End: 2024-01-26

## 2024-01-19 RX ORDER — LIDOCAINE 50 MG/G
PATCH TOPICAL
COMMUNITY
Start: 2024-01-19

## 2024-01-19 ASSESSMENT — PATIENT HEALTH QUESTIONNAIRE - PHQ9
SUM OF ALL RESPONSES TO PHQ QUESTIONS 1-9: 0
2. FEELING DOWN, DEPRESSED OR HOPELESS: 0
SUM OF ALL RESPONSES TO PHQ9 QUESTIONS 1 & 2: 0
1. LITTLE INTEREST OR PLEASURE IN DOING THINGS: 0
SUM OF ALL RESPONSES TO PHQ QUESTIONS 1-9: 0

## 2024-01-19 NOTE — PROGRESS NOTES
[address] [place] [address]               [phone]      Francisco Ross is a 28 y.o. female and presents with Follow-up, stiff neck, and Finger Pain (Numbness )       Assessment/Plan:    1. Irregular menstruation, unspecified  Comments:  f/u with OB/GYN regarding large clot on menses  2. Hypothyroidism, unspecified type  3. Lumbar radiculopathy  -     [facility]  4. Transitional vertebra  5. Urinary tract infection without hematuria, site unspecified  Comments:  seen in ER this morning and diagnosed with UTI-prescribed Keflex  6. Neck pain  Comments:  placed on Lidoderm patches/robaxin  Orders:  -     [facility]  7. Hypertriglyceridemia  -     fenofibrate (TRICOR) 48 MG tablet; Take 1 tablet by mouth daily, Disp-90 tablet, R-1Normal  8. Depression, unspecified depression type         Follow up and disposition:   Return in about 2 months (around 3/19/2024).      Subjective:    Labs obtained prior to visit? No  Reviewed with patient? N/A    [facility] 1-  --patient went to ER and diagnosed with UTI (on Keflex)  --follow up with OB/GYN    ROS:     Review of Systems   Constitutional:  Negative for chills, diaphoresis, fatigue and fever.   HENT:  Negative for congestion, ear pain and rhinorrhea.    Respiratory:  Negative for cough and shortness of breath.    Cardiovascular:  Negative for chest pain.   Gastrointestinal:  Negative for diarrhea, nausea and vomiting.   Genitourinary:  Positive for dysuria and frequency.        On antibiotic by ER started today   Musculoskeletal:  Positive for back pain and neck pain.   Skin:  Negative for rash.   Neurological:  Negative for light-headedness.   Psychiatric/Behavioral:  Negative for suicidal ideas.          The problem list was updated as a part of today's visit.  Patient Active Problem List   Diagnosis    Hypothyroidism

## 2024-01-19 NOTE — PROGRESS NOTES
Francisco Ross presents today for   Chief Complaint   Patient presents with    Follow-up    stiff neck    Finger Pain     Numbness        Is someone accompanying this pt? no    Is the patient using any DME equipment during OV? no    Depression Screening:       No data to display                Learning Assessment:      Health Maintenance reviewed and discussed and ordered per Provider.    Health Maintenance Due   Topic Date Due    Hepatitis B vaccine (1 of 3 - 3-dose series) Never done    Flu vaccine (1) 08/01/2023    COVID-19 Vaccine (4 - 2023-24 season) 09/01/2023    Cervical cancer screen  12/12/2023   .      Coordination of Care:  1. Have you been to the ER, urgent care clinic since your last visit? Hospitalized since your last visit? 1/19 SPA cramps     2. Have you seen or consulted any other health care providers outside of the [facility] since your last visit? Include any pap smears or colon screening no    3. For patients aged 45-75: Has the patient had a colonoscopy / FIT/ Cologuard? N/A      If the patient is female:    4. For patients aged 40-74: Has the patient had a mammogram within the past 2 years? N/a      5. For patients aged 21-65: Has the patient had a pap smear? N/a

## 2024-01-23 ASSESSMENT — ENCOUNTER SYMPTOMS
VOMITING: 0
SHORTNESS OF BREATH: 0
RHINORRHEA: 0
NAUSEA: 0
COUGH: 0
BACK PAIN: 1

## 2024-01-26 ENCOUNTER — HOSPITAL ENCOUNTER (OUTPATIENT)
Facility: HOSPITAL | Age: 29
Setting detail: RECURRING SERIES
Discharge: HOME | End: 2024-01-29
Payer: COMMERCIAL

## 2024-01-26 PROCEDURE — 97530 THERAPEUTIC ACTIVITIES: CPT

## 2024-01-26 PROCEDURE — 97162 PT EVAL MOD COMPLEX 30 MIN: CPT

## 2024-01-26 PROCEDURE — 97535 SELF CARE MNGMENT TRAINING: CPT

## 2024-01-26 NOTE — PROGRESS NOTES
[facility]   2/14/2024  1:00 PM Francie Millard, PT [facility]   2/16/2024  3:00 PM Dixon, Autumn, PTA [facility]   2/19/2024  3:00 PM Francie Millard, PT [facility]   2/23/2024  3:00 PM Dixon, Autumn, PTA [facility]   2/26/2024  3:00 PM Francie Millard, PT [facility]   3/1/2024  3:00 PM Dixon, Autumn, PTA [facility]   3/19/2024 10:30 AM Alba Jain APRN - NP AMA [facility]

## 2024-01-26 NOTE — THERAPY EVALUATION
[facility]IA [facility]  [address], [address], [place] [address] Ph:[phone] Fx: [phone]  Plan of Care / Statement of Necessity for Physical Therapy Services     Patient Name: Francisco Ross : 1995   Medical   Diagnosis: Neck pain [M54.2]  Other low back pain [M54.59] Treatment Diagnosis: M54.2, G89.29  CHRONIC NECK PAIN and M54.59, G89.29  CHRONIC LOWER BACK PAIN    Onset Date: 2023 (back), Dec 2023 (neck)     Referral Source: Alba Jain APRN * Start of Care (SOC): 2024   Prior Hospitalization: See medical history Provider #: [id number]   Prior Level of Function: Able to complete all ADLs and work duties without pain   Comorbidities: Obesity, anxiety, depression, hypothyroidism       Subjective: Initially, pt reports LBP began 2022, but resolved with PT and pain medication. Pain began again in 2023 and has not resolved since. Pt had MRI in 2023 for low back showing mild disc involvement and inflammation of L2-L3 and L4-L5. Neck p! Began mid-December and recently has become worse with cervical rotation and lateral side-bending, with numbness and tingling down R UE and into digits 3-4. Prolonged standing and walking does not increase p! In either low back or neck, but activities such as playing with her son, working at the  she is employed at, and cleaning are difficult and cause pain. Pain at worst rated 10/10 in the low back and 6/10 at worst at the neck, primarily with rotation. Pain at the best for the low back is 0/10, and 0/10 in the neck, with rest and walking. More recently, patient has had difficulty falling asleep and is woken up throughout the night due to pain and paresthesias in the RUE, sleeping 3 hours max at a time without interruption.     Assessment / key information:  Pt presents to [facility] at [facility] with signs and symptoms congruent with a diagnosis of low back pain and

## 2024-01-31 ENCOUNTER — HOSPITAL ENCOUNTER (OUTPATIENT)
Facility: HOSPITAL | Age: 29
Setting detail: RECURRING SERIES
Discharge: HOME | End: 2024-02-03
Payer: COMMERCIAL

## 2024-01-31 PROCEDURE — 97535 SELF CARE MNGMENT TRAINING: CPT

## 2024-01-31 PROCEDURE — 97112 NEUROMUSCULAR REEDUCATION: CPT

## 2024-01-31 PROCEDURE — 97140 MANUAL THERAPY 1/> REGIONS: CPT

## 2024-01-31 NOTE — PROGRESS NOTES
PHYSICAL / OCCUPATIONAL THERAPY - DAILY TREATMENT NOTE (updated )    Patient Name: Francisco Ross    Date: 2024    : 1995  Insurance: Payor: JASMIN / Plan: [employer] / Product Type: *No Product type* /      Patient  verified Yes     Visit #   Current / Total 2 12 (30)   Time   In / Out 1220 100   Pain   In / Out 2 1   Subjective Functional Status/Changes: Patient reports she continues to have difficulty with c/s rot and gets a sharp pain at R side of proximal c/s.   Changes to:  Meds, Allergies, Med Hx, Sx Hx?  If yes, update Summary List no       TREATMENT AREA =  Neck pain [M54.2]  Other low back pain [M54.59]    OBJECTIVE         Therapeutic Procedures:    Tx Min Billable or 1:1 Min (if diff from Tx Min) Procedure, Rationale, Specifics     68034 Therapeutic Exercise (timed):  increase ROM, strength, coordination, balance, and proprioception to improve patient's ability to progress to PLOF and address remaining functional goals. (see flow sheet as applicable)     Details if applicable:       17  05008 Neuromuscular Re-Education (timed):  improve balance, coordination, kinesthetic sense, posture, core stability and proprioception to improve patient's ability to develop conscious control of individual muscles and awareness of position of extremities in order to progress to PLOF and address remaining functional goals. (see flow sheet as applicable)     Details if applicable:     15  66106 Manual Therapy (timed):  decrease pain, increase ROM, increase tissue extensibility, decrease edema, correct positional vertigo, decrease trigger points, and increase postural awareness to improve patient's ability to progress to PLOF and address remaining functional goals.  The manual therapy interventions were performed at a separate and distinct time from the therapeutic activities interventions . (see flow sheet as applicable)     Details if applicable:  STM: R SCM, c/s paraspinals, UT,

## 2024-02-02 ENCOUNTER — HOSPITAL ENCOUNTER (OUTPATIENT)
Facility: HOSPITAL | Age: 29
Setting detail: RECURRING SERIES
Discharge: HOME | End: 2024-02-05
Payer: COMMERCIAL

## 2024-02-02 PROCEDURE — 97112 NEUROMUSCULAR REEDUCATION: CPT

## 2024-02-02 PROCEDURE — 97535 SELF CARE MNGMENT TRAINING: CPT

## 2024-02-02 PROCEDURE — 97530 THERAPEUTIC ACTIVITIES: CPT

## 2024-02-02 PROCEDURE — 97140 MANUAL THERAPY 1/> REGIONS: CPT

## 2024-02-02 NOTE — PROGRESS NOTES
deficits, analyze and address ROM deficits, analyze and address strength deficits, analyze and address soft tissue restrictions, analyze and cue for proper movement patterns, analyze and modify for postural abnormalities, analyze and address imbalance/dizziness, and instruct in home and community integration to address functional deficits and attain remaining goals.    Progress toward goals / Updated goals:  []  See Progress Note/Recertification    Next PN/ RC due 2/26/24  Auth due 12/31/24  PLAN  Yes  Continue plan of care  [x]  Upgrade activities as tolerated  []  Discharge due to :  []  Other:    CLAUDE KIRK PTA    2/2/2024    3:50 PM    Future Appointments   Date Time Provider Department Center   2/5/2024  4:20 PM Francie Millard, PT [facility]   2/9/2024  3:00 PM Claude Kirk PTA [facility]   2/14/2024  1:00 PM Francie Millard, PT [facility]   2/16/2024  3:00 PM Claude Kirk PTA [facility]   2/19/2024  3:00 PM Francie Millard, PT [facility]   2/23/2024  3:00 PM Claude Kirk PTA [facility]   2/26/2024  3:00 PM Francie Millard, PT [facility]   3/1/2024  3:00 PM Claude Kirk, PTA [facility]   3/19/2024 10:30 AM Alba Jain, APRN - NP [facility]

## 2024-02-05 ENCOUNTER — HOSPITAL ENCOUNTER (OUTPATIENT)
Facility: HOSPITAL | Age: 29
Setting detail: RECURRING SERIES
Discharge: HOME | End: 2024-02-08
Payer: COMMERCIAL

## 2024-02-05 PROCEDURE — 97110 THERAPEUTIC EXERCISES: CPT

## 2024-02-05 PROCEDURE — 97140 MANUAL THERAPY 1/> REGIONS: CPT

## 2024-02-05 PROCEDURE — 97112 NEUROMUSCULAR REEDUCATION: CPT

## 2024-02-05 NOTE — PROGRESS NOTES
PHYSICAL / OCCUPATIONAL THERAPY - DAILY TREATMENT NOTE (updated )    Patient Name: Francisco Ross    Date: 2024    : 1995  Insurance: Payor: JASMIN / Plan: [employer] / Product Type: *No Product type* /      Patient  verified Yes     Visit #   Current / Total 4 12 (30)   Time   In / Out 4:22 500   Pain   In / Out 0/10  0   Subjective Functional Status/Changes: Neck is a little achey. HEP helps in the moment, but little carryover noted. Reports yesterday feeling a throbbing pain along the L side of the neck that extended along the top of the skull and to the forehead.      TREATMENT AREA =  Neck pain [M54.2]  Other low back pain [M54.59]    OBJECTIVE    Therapeutic Procedures:  Tx Min Billable or 1:1 Min (if diff from Tx Min) Procedure, Rationale, Specifics   10  75337 Therapeutic Exercise (timed):  increase ROM, strength, coordination, balance, and proprioception to improve patient's ability to progress to PLOF and address remaining functional goals. (see flow sheet as applicable)     Details if applicable:       10  36425 Neuromuscular Re-Education (timed):  improve balance, coordination, kinesthetic sense, posture, core stability and proprioception to improve patient's ability to develop conscious control of individual muscles and awareness of position of extremities in order to progress to PLOF and address remaining functional goals. (see flow sheet as applicable)     Details if applicable:     18  41801 Manual Therapy (timed):  decrease pain, increase ROM, increase tissue extensibility, decrease edema, correct positional vertigo, decrease trigger points, and increase postural awareness to improve patient's ability to progress to PLOF and address remaining functional goals.  The manual therapy interventions were performed at a separate and distinct time from the therapeutic activities interventions . (see flow sheet as applicable)     Details if applicable:  STM: R/L SCM, c/s

## 2024-02-09 ENCOUNTER — HOSPITAL ENCOUNTER (OUTPATIENT)
Facility: HOSPITAL | Age: 29
Setting detail: RECURRING SERIES
Discharge: HOME | End: 2024-02-12
Payer: COMMERCIAL

## 2024-02-09 PROCEDURE — 97112 NEUROMUSCULAR REEDUCATION: CPT

## 2024-02-09 PROCEDURE — 97530 THERAPEUTIC ACTIVITIES: CPT

## 2024-02-09 PROCEDURE — 97110 THERAPEUTIC EXERCISES: CPT

## 2024-02-09 NOTE — PROGRESS NOTES
integration to address functional deficits and attain remaining goals.    Progress toward goals / Updated goals:  []  See Progress Note/Recertification    Next PN/ RC due 2/26/24  Auth due 12/31/24      PLAN  Yes  Continue plan of care  [x]  Upgrade activities as tolerated  []  Discharge due to :  []  Other:    CLAUDE KIRK PTA    2/9/2024    4:04 PM    Future Appointments   Date Time Provider Department Center   2/14/2024  1:00 PM Francie Millard PT [facility]   2/16/2024  3:00 PM Claude Kirk PTA [facility]   2/19/2024  3:00 PM Francie Millard PT [facility]   2/23/2024  3:00 PM Claude Kirk PTA [facility]   2/26/2024  3:00 PM Francie Millard PT [facility]   3/1/2024  3:00 PM Claude Kirk PTA [facility]   3/19/2024 10:30 AM Alba Jain, APRN - NP AMA [facility]

## 2024-02-14 ENCOUNTER — HOSPITAL ENCOUNTER (OUTPATIENT)
Facility: HOSPITAL | Age: 29
Setting detail: RECURRING SERIES
Discharge: HOME | End: 2024-02-17
Payer: COMMERCIAL

## 2024-02-14 PROCEDURE — 97112 NEUROMUSCULAR REEDUCATION: CPT

## 2024-02-14 PROCEDURE — 97110 THERAPEUTIC EXERCISES: CPT

## 2024-02-14 NOTE — PROGRESS NOTES
imbalance/dizziness, and instruct in home and community integration to address functional deficits and attain remaining goals.    Progress toward goals / Updated goals:  []  See Progress Note/Recertification    Next PN/ RC due 2/26/24  Auth due 12/31/24    ROM improving    PLAN  Yes  Continue plan of care  [x]  Upgrade activities as tolerated  []  Discharge due to :  []  Other:    Francie Millard PT    2/14/2024    11:02 AM    Future Appointments   Date Time Provider Department Center   2/14/2024  1:00 PM Francie Millard PT [facility]   2/16/2024  3:00 PM Aspen Kirk, PTA [facility]   2/19/2024  3:00 PM Francie Millard PT [facility]   2/23/2024  3:00 PM Aspen Kirk PTA [facility]   2/26/2024  3:00 PM Francie Millard, PT [facility]   3/1/2024  3:00 PM Aspen Kirk PTA [facility]   3/19/2024 10:30 AM Alba Jain APRN - NP AMA [facility]

## 2024-02-16 ENCOUNTER — HOSPITAL ENCOUNTER (OUTPATIENT)
Facility: HOSPITAL | Age: 29
Setting detail: RECURRING SERIES
Discharge: HOME | End: 2024-02-19
Payer: COMMERCIAL

## 2024-02-16 PROCEDURE — 97112 NEUROMUSCULAR REEDUCATION: CPT

## 2024-02-16 PROCEDURE — 97110 THERAPEUTIC EXERCISES: CPT

## 2024-02-16 NOTE — PROGRESS NOTES
PHYSICAL / OCCUPATIONAL THERAPY - DAILY TREATMENT NOTE (updated )    Patient Name: Francisco Ross    Date: 2024    : 1995  Insurance: Payor: JASMIN / Plan: [employer] / Product Type: *No Product type* /      Patient  verified Yes     Visit #   Current / Total 7 12 (30)   Time   In / Out 305 345   Pain   In / Out 0/10  010   Subjective Functional Status/Changes: Says that she can only feel it if she tries to find it.  Says back pain is because of monthly cycle today.     TREATMENT AREA =  Neck pain [M54.2]  Other low back pain [M54.59]    OBJECTIVE    Therapeutic Procedures:  Tx Min Billable or 1:1 Min (if diff from Tx Min) Procedure, Rationale, Specifics   30  94958 Therapeutic Exercise (timed):  increase ROM, strength, coordination, balance, and proprioception to improve patient's ability to progress to PLOF and address remaining functional goals. (see flow sheet as applicable)     Details if applicable:       10  85208 Neuromuscular Re-Education (timed):  improve balance, coordination, kinesthetic sense, posture, core stability and proprioception to improve patient's ability to develop conscious control of individual muscles and awareness of position of extremities in order to progress to PLOF and address remaining functional goals. (see flow sheet as applicable)     Details if applicable:       17914 Manual Therapy (timed):  decrease pain, increase ROM, increase tissue extensibility, decrease edema, correct positional vertigo, decrease trigger points, and increase postural awareness to improve patient's ability to progress to PLOF and address remaining functional goals.  The manual therapy interventions were performed at a separate and distinct time from the therapeutic activities interventions . (see flow sheet as applicable)     Details if applicable:  STM: R/L SCM, c/s paraspinals, UT, scalenes; c/s distraction and SOR     21958 Therapeutic Activity (timed):  use of

## 2024-02-19 ENCOUNTER — HOSPITAL ENCOUNTER (OUTPATIENT)
Facility: HOSPITAL | Age: 29
Setting detail: RECURRING SERIES
Discharge: HOME | End: 2024-02-22
Payer: COMMERCIAL

## 2024-02-19 PROCEDURE — 97110 THERAPEUTIC EXERCISES: CPT

## 2024-02-19 PROCEDURE — 97112 NEUROMUSCULAR REEDUCATION: CPT

## 2024-02-19 NOTE — PROGRESS NOTES
PHYSICAL / OCCUPATIONAL THERAPY - DAILY TREATMENT NOTE (updated )    Patient Name: Francisco Ross    Date: 2024    : 1995  Insurance: Payor: JASMIN / Plan: [employer] / Product Type: *No Product type* /      Patient  verified Yes     Visit #   Current / Total 8 12 (30)   Time   In / Out 303 341   Pain   In / Out 0/10  0/10   Subjective Functional Status/Changes: When I don't look for it, it's fine. When I go looking for it, its a 0.5/10; so actually getting a lot better.      TREATMENT AREA =  Neck pain [M54.2]  Other low back pain [M54.59]    OBJECTIVE    Therapeutic Procedures:  Tx Min Billable or 1:1 Min (if diff from Tx Min) Procedure, Rationale, Specifics   30  11891 Therapeutic Exercise (timed):  increase ROM, strength, coordination, balance, and proprioception to improve patient's ability to progress to PLOF and address remaining functional goals. (see flow sheet as applicable)     Details if applicable:     8  19032 Neuromuscular Re-Education (timed):  improve balance, coordination, kinesthetic sense, posture, core stability and proprioception to improve patient's ability to develop conscious control of individual muscles and awareness of position of extremities in order to progress to PLOF and address remaining functional goals. (see flow sheet as applicable)     Details if applicable:       92551 Manual Therapy (timed):  decrease pain, increase ROM, increase tissue extensibility, decrease edema, correct positional vertigo, decrease trigger points, and increase postural awareness to improve patient's ability to progress to PLOF and address remaining functional goals.  The manual therapy interventions were performed at a separate and distinct time from the therapeutic activities interventions . (see flow sheet as applicable)     Details if applicable:  STM: R/L SCM, c/s paraspinals, UT, scalenes; c/s distraction and SOR     16201 Therapeutic Activity (timed):  use of

## 2024-02-23 ENCOUNTER — HOSPITAL ENCOUNTER (OUTPATIENT)
Facility: HOSPITAL | Age: 29
Setting detail: RECURRING SERIES
Discharge: HOME | End: 2024-02-26
Payer: COMMERCIAL

## 2024-02-23 PROCEDURE — 97112 NEUROMUSCULAR REEDUCATION: CPT

## 2024-02-23 PROCEDURE — 97140 MANUAL THERAPY 1/> REGIONS: CPT

## 2024-02-23 NOTE — PROGRESS NOTES
QL, glutes     55107 Therapeutic Activity (timed):  use of dynamic activities replicating functional movements to increase ROM, strength, coordination, balance, and proprioception in order to improve patient's ability to progress to PLOF and address remaining functional goals.  (see flow sheet as applicable)     Details if applicable:       37353 Self Care/Home Management (timed):  improve patient knowledge and understanding of pain reducing techniques, positioning, posture/ergonomics, home safety, activity modification, diagnosis/prognosis, and physical therapy expectations, procedures and progression  to improve patient's ability to progress to PLOF and address remaining functional goals.  (see flow sheet as applicable)     Details if applicable:  Educated on disc health with spine model.  Discussed posture as a role and including everyday lifting techniques in job.   [facility] Totals Reminder: bill using total billable min of TIMED therapeutic procedures (example: do not include dry needle or estim unattended, both untimed codes, in totals to left)  8-22 min = 1 unit; 23-37 min = 2 units; 38-52 min = 3 units; 53-67 min = 4 units; 68-82 min = 5 units   Total Total     [x]  Patient Education billed concurrently with other procedures   [x] Review HEP    [] Progressed/Changed HEP, detail:    [] Other detail:       Objective Information/Functional Measures/Assessment  Began session with core strengthening followed by upper back and cervical strengthening. Patient had significant TrP from mid-thoracic to glutes with poor tolerance to MT.  Had patient perform foam rolling of mid-lower back. Patient had very poor tolerance to this, however she continued to perform.  She did tolerate well supine foam roller in vertical position. Post foam roller, had patient perform repeated hip hinge as she notes that is what she does all day at work. She noted no pain post, however did note soreness. Advised patient to continue with

## 2024-02-26 ENCOUNTER — HOSPITAL ENCOUNTER (OUTPATIENT)
Facility: HOSPITAL | Age: 29
Setting detail: RECURRING SERIES
End: 2024-02-26
Payer: COMMERCIAL

## 2024-02-28 ENCOUNTER — HOSPITAL ENCOUNTER (OUTPATIENT)
Facility: HOSPITAL | Age: 29
Setting detail: RECURRING SERIES
Discharge: HOME | End: 2024-03-02
Payer: COMMERCIAL

## 2024-02-28 PROCEDURE — 97110 THERAPEUTIC EXERCISES: CPT

## 2024-02-28 PROCEDURE — 97112 NEUROMUSCULAR REEDUCATION: CPT

## 2024-02-28 PROCEDURE — 97535 SELF CARE MNGMENT TRAINING: CPT

## 2024-02-28 NOTE — PROGRESS NOTES
PHYSICAL / OCCUPATIONAL THERAPY - DAILY TREATMENT NOTE (updated )    Patient Name: Francisco Ross    Date: 2024    : 1995  Insurance: Payor: JASMIN / Plan: [employer] / Product Type: *No Product type* /      Patient  verified Yes     Visit #   Current / Total 10 12 (30)   Time   In / Out 1020 1100   Pain   In / Out 0  0   Subjective Functional Status/Changes: Reports c/s pain is even less than previously reported - .25/10. Says that he back pain is mainly on the low R back and that after she does LTR and bridges at end of day it feels much better - goes from -7/10 to -3/10.     TREATMENT AREA =  Neck pain [M54.2]  Other low back pain [M54.59]    OBJECTIVE    Therapeutic Procedures:  Tx Min Billable or 1:1 Min (if diff from Tx Min) Procedure, Rationale, Specifics   15  05520 Therapeutic Exercise (timed):  increase ROM, strength, coordination, balance, and proprioception to improve patient's ability to progress to PLOF and address remaining functional goals. (see flow sheet as applicable)     Details if applicable:     15  00706 Neuromuscular Re-Education (timed):  improve balance, coordination, kinesthetic sense, posture, core stability and proprioception to improve patient's ability to develop conscious control of individual muscles and awareness of position of extremities in order to progress to PLOF and address remaining functional goals. (see flow sheet as applicable)     Details if applicable:       54791 Manual Therapy (timed):  decrease pain, increase ROM, increase tissue extensibility, decrease edema, correct positional vertigo, decrease trigger points, and increase postural awareness to improve patient's ability to progress to PLOF and address remaining functional goals.  The manual therapy interventions were performed at a separate and distinct time from the therapeutic activities interventions . (see flow sheet as applicable)     Details if applicable:  DTM: daly

## 2024-03-01 ENCOUNTER — HOSPITAL ENCOUNTER (OUTPATIENT)
Facility: HOSPITAL | Age: 29
Setting detail: RECURRING SERIES
Discharge: HOME | End: 2024-03-04
Payer: COMMERCIAL

## 2024-03-01 PROCEDURE — 97112 NEUROMUSCULAR REEDUCATION: CPT

## 2024-03-01 PROCEDURE — 97110 THERAPEUTIC EXERCISES: CPT

## 2024-03-01 NOTE — PROGRESS NOTES
and address soft tissue restrictions, analyze and cue for proper movement patterns, analyze and modify for postural abnormalities, analyze and address imbalance/dizziness, and instruct in home and community integration to address functional deficits and attain remaining goals.    Progress toward goals / Updated goals:  []  See Progress Note/Recertification  Pt to improve FOTO score to 74/100 indicating improved function in daily tasks  Status at last Eval: 65/100  Current Status: -  Goal Met?  -  2.  Pt to indicate a Global Rating Of Change (GROC) of 4+ indicating \"moderately better\"  Status at last Eval: n/a  Current Status: -  Goal Met?  -  3. Pt to improve cervical rotation testing to 80 degrees bilaterally without increase in symptoms to indicate improved ability to complete work duties.  Status at last Eval: R - 65 degrees; L - 76 degrees  Current Status: R - 65 deg; L 56 deg - 2/28/24  Goal Met?   4. Pt to deny LE symptoms over the past 3 weeks indicating resolution of current status  Status at last Eval: daily pain in low back, daily pain in neck, daily RUE paresthesias  Current Status: reported no back pain at end of day today - however pt notes she did not  little kids - 3/1/24.  Goal Met?  -    Next PN/ RC due 3/28/24  Auth due 12/31/24      PLAN  Yes  Continue plan of care  [x]  Upgrade activities as tolerated  []  Discharge due to :  []  Other:    CLAUDE KIRK PTA    3/1/2024    4:37 PM    Future Appointments   Date Time Provider Department Center   3/8/2024  8:20 AM Francie Millard PT [facility]   3/13/2024  1:40 PM Francie Millard PT [facility]   3/15/2024  1:40 PM Claude Kirk PTA [facility]   3/19/2024 10:30 AM Alba Jain APRN - NP AMA [facility]   3/20/2024  1:00 PM Francie Millard PT [facility]   3/22/2024  1:40 PM Claude Kirk PTA [facility]   3/25/2024 11:40 AM Claude Kirk PTA [facility]   3/29/2024  3:00 PM Claude Kirk PTA [facility]   4/1/2024  4:20 PM Francie Millard, PT [facility]

## 2024-03-08 ENCOUNTER — HOSPITAL ENCOUNTER (OUTPATIENT)
Facility: HOSPITAL | Age: 29
Setting detail: RECURRING SERIES
End: 2024-03-08
Payer: COMMERCIAL

## 2024-03-12 ENCOUNTER — OFFICE VISIT (OUTPATIENT)
Facility: CLINIC | Age: 29
End: 2024-03-12
Payer: COMMERCIAL

## 2024-03-12 VITALS
HEIGHT: 62 IN | RESPIRATION RATE: 18 BRPM | HEART RATE: 80 BPM | TEMPERATURE: 98.3 F | WEIGHT: 201.2 LBS | BODY MASS INDEX: 37.02 KG/M2 | OXYGEN SATURATION: 97 % | SYSTOLIC BLOOD PRESSURE: 118 MMHG | DIASTOLIC BLOOD PRESSURE: 77 MMHG

## 2024-03-12 DIAGNOSIS — M54.2 NECK PAIN: ICD-10-CM

## 2024-03-12 DIAGNOSIS — J40 BRONCHITIS: ICD-10-CM

## 2024-03-12 DIAGNOSIS — F32.A DEPRESSION, UNSPECIFIED DEPRESSION TYPE: ICD-10-CM

## 2024-03-12 DIAGNOSIS — M54.16 LUMBAR RADICULOPATHY: Primary | ICD-10-CM

## 2024-03-12 DIAGNOSIS — E03.9 HYPOTHYROIDISM, UNSPECIFIED TYPE: ICD-10-CM

## 2024-03-12 PROCEDURE — 99213 OFFICE O/P EST LOW 20 MIN: CPT | Performed by: NURSE PRACTITIONER

## 2024-03-12 RX ORDER — LEVOTHYROXINE SODIUM 0.1 MG/1
100 TABLET ORAL
Qty: 90 TABLET | Refills: 2 | Status: SHIPPED | OUTPATIENT
Start: 2024-03-12

## 2024-03-12 RX ORDER — DOXYCYCLINE HYCLATE 100 MG/1
100 CAPSULE ORAL EVERY 12 HOURS
COMMUNITY
Start: 2024-03-09

## 2024-03-12 RX ORDER — METHYLPREDNISOLONE 4 MG/1
TABLET ORAL
COMMUNITY
Start: 2024-03-09

## 2024-03-12 RX ORDER — BENZONATATE 100 MG/1
CAPSULE ORAL
COMMUNITY
Start: 2024-03-09

## 2024-03-12 ASSESSMENT — PATIENT HEALTH QUESTIONNAIRE - PHQ9
2. FEELING DOWN, DEPRESSED OR HOPELESS: NOT AT ALL
SUM OF ALL RESPONSES TO PHQ QUESTIONS 1-9: 0
SUM OF ALL RESPONSES TO PHQ9 QUESTIONS 1 & 2: 0
SUM OF ALL RESPONSES TO PHQ QUESTIONS 1-9: 0
1. LITTLE INTEREST OR PLEASURE IN DOING THINGS: NOT AT ALL
SUM OF ALL RESPONSES TO PHQ QUESTIONS 1-9: 0
SUM OF ALL RESPONSES TO PHQ QUESTIONS 1-9: 0

## 2024-03-12 NOTE — ASSESSMENT & PLAN NOTE
Seen at [facility] on Thursday and Saturday; negative for flu, covid, and strep  --on medrol dose pack and antibiotics until finished

## 2024-03-12 NOTE — PROGRESS NOTES
[address] [place] [address]               [phone]      Francisco Ross is a 29 y.o. female and presents with Follow-up (Urgent care)       Assessment/Plan:    1. Lumbar radiculopathy  Assessment & Plan:   Monitored by specialist- no acute findings meriting change in the plan  Seeing physical therapy and Dr. Delcid  2. Hypothyroidism, unspecified type  -     levothyroxine (SYNTHROID) 100 MCG tablet; Take 1 tablet by mouth every morning (before breakfast), Disp-90 tablet, R-2Normal  3. Neck pain  4. Depression, unspecified depression type  5. Bronchitis  Assessment & Plan:  Seen at [facility] on Thursday and Saturday; negative for flu, covid, and strep  --on medrol dose pack and antibiotics until finished          Follow up and disposition:   No follow-ups on file.      Subjective:    Labs obtained prior to visit? No  Reviewed with patient? N/A    Patient was seen at [facility] on Thursday and was negative for flu, covid and strep throat  --patient went back to [facility] on Saturday   --was placed on doxycycline and medrol dose pack   --pt has not been taking antibiotic, just steroid     [facility] 2-    ROS:     Review of Systems   Constitutional:  Negative for chills, diaphoresis, fatigue and fever.   HENT:  Negative for congestion, ear pain and rhinorrhea.    Respiratory:  Positive for cough. Negative for shortness of breath.    Cardiovascular:  Negative for chest pain.   Gastrointestinal:  Negative for diarrhea, nausea and vomiting.   Genitourinary:  Negative for dysuria and frequency.   Musculoskeletal:  Positive for back pain and neck pain.   Skin:  Negative for rash.   Neurological:  Negative for light-headedness.   Psychiatric/Behavioral:  Negative for suicidal ideas.          The problem list was updated as a part of today's visit.  Patient Active Problem List   Diagnosis    Hypothyroidism    Anxiety disorder    Hypercholesterolemia    Depression    Severe obesity (BMI 35.0-39.9)

## 2024-03-12 NOTE — PROGRESS NOTES
Francisco Ross presents today for   Chief Complaint   Patient presents with    Follow-up     Urgent care       Is someone accompanying this pt? no    Is the patient using any DME equipment during OV? no    Depression Screening:       No data to display                Learning Assessment:  Failed to redirect to the Timeline version of the [facility].    Health Maintenance reviewed and discussed and ordered per Provider.    Health Maintenance Due   Topic Date Due    Hepatitis B vaccine (1 of 3 - 3-dose series) Never done    Flu vaccine (1) 08/01/2023    COVID-19 Vaccine (4 - 2023-24 season) 09/01/2023    Cervical cancer screen  12/12/2023   .    \"Have you been to the ER, urgent care clinic since your last visit?  Hospitalized since your last visit?\"    Patient first 3/7 headache     “Have you seen or consulted any other health care providers outside of [facility] since your last visit?”    no      “Have you had a pap smear?”    Yes woman care

## 2024-03-13 ENCOUNTER — HOSPITAL ENCOUNTER (OUTPATIENT)
Facility: HOSPITAL | Age: 29
Setting detail: RECURRING SERIES
Discharge: HOME | End: 2024-03-16
Payer: COMMERCIAL

## 2024-03-13 PROCEDURE — 97110 THERAPEUTIC EXERCISES: CPT

## 2024-03-13 PROCEDURE — 97530 THERAPEUTIC ACTIVITIES: CPT

## 2024-03-13 NOTE — PROGRESS NOTES
remaining goals.    Progress toward goals / Updated goals:  []  See Progress Note/Recertification  Pt to improve FOTO score to 74/100 indicating improved function in daily tasks  Status at last Eval: 65/100  Current Status: -  Goal Met?  -  2.  Pt to indicate a Global Rating Of Change (GROC) of 4+ indicating \"moderately better\"  Status at last Eval: n/a  Current Status: -  Goal Met?  -  3. Pt to improve cervical rotation testing to 80 degrees bilaterally without increase in symptoms to indicate improved ability to complete work duties.  Status at last Eval: R - 65 degrees; L - 76 degrees  Current Status: R - 65 deg; L 56 deg - 2/28/24  Goal Met?   4. Pt to deny LE symptoms over the past 3 weeks indicating resolution of current status  Status at last Eval: daily pain in low back, daily pain in neck, daily RUE paresthesias  Current Status: reported no back pain at end of day today - however pt notes she did not  little kids - 3/1/24.  Goal Met?  -    Next PN/ RC due 3/28/24  Auth due 12/31/24      PLAN  Yes  Continue plan of care  [x]  Upgrade activities as tolerated  []  Discharge due to :  []  Other:    Francie Millard, PT    3/13/2024    7:35 AM    Future Appointments   Date Time Provider Department Center   3/13/2024  1:40 PM Francie Millard, PT [facility]   3/15/2024  1:40 PM Aspen Kirk, PTA [facility]   3/20/2024  1:00 PM Francie Millard PT [facility]   3/22/2024  1:40 PM KirkAspen, PTA [facility]   3/25/2024 11:40 AM KirkAspen, PTA [facility]   3/27/2024  3:30 PM [facility]   3/27/2024  3:45 PM Alba Jain APRN - NP [facility]   3/29/2024  3:00 PM KirkAspen, PTA [facility]   4/1/2024  4:20 PM Francie Millard, PT [facility]   4/5/2024  3:00 PM Aspen Kirk, PTA [facility]   4/9/2024  4:20 PM Francie Millard, PT [facility]   4/11/2024  5:00 PM Francie Millard, PT [facility]   4/16/2024  4:20 PM Francie Millard, PT [facility]   4/18/2024  5:00 PM Francie Millard, PT [facility]   4/23/2024  5:00 PM Francie Millard,

## 2024-03-15 ENCOUNTER — HOSPITAL ENCOUNTER (OUTPATIENT)
Facility: HOSPITAL | Age: 29
Setting detail: RECURRING SERIES
Discharge: HOME | End: 2024-03-18
Payer: COMMERCIAL

## 2024-03-15 PROCEDURE — 97530 THERAPEUTIC ACTIVITIES: CPT

## 2024-03-15 PROCEDURE — 97110 THERAPEUTIC EXERCISES: CPT

## 2024-03-15 NOTE — PROGRESS NOTES
PHYSICAL / OCCUPATIONAL THERAPY - DAILY TREATMENT NOTE (updated )    Patient Name: Francisco Ross    Date: 3/15/2024    : 1995  Insurance: Payor: JASMIN / Plan: [facility] / Product Type: *No Product type* /      Patient  verified Yes     Visit #   Current / Total 13 20 (30)   Time   In / Out 147 233   Pain   In / Out 0  0   Subjective Functional Status/Changes: Patient noticing decrease levels of pain in back at end of day.  Says that she still has to \"look\" for pain in neck.  Notes she no longer has numbness in hands.     TREATMENT AREA =  Neck pain [M54.2]  Other low back pain [M54.59]    OBJECTIVE    Therapeutic Procedures:  Tx Min Billable or 1:1 Min (if diff from Tx Min) Procedure, Rationale, Specifics   10  63755 Therapeutic Exercise (timed):  increase ROM, strength, coordination, balance, and proprioception to improve patient's ability to progress to PLOF and address remaining functional goals. (see flow sheet as applicable)     Details if applicable:       45948 Neuromuscular Re-Education (timed):  improve balance, coordination, kinesthetic sense, posture, core stability and proprioception to improve patient's ability to develop conscious control of individual muscles and awareness of position of extremities in order to progress to PLOF and address remaining functional goals. (see flow sheet as applicable)     Details if applicable:       72583 Manual Therapy (timed):  decrease pain, increase ROM, increase tissue extensibility, decrease edema, correct positional vertigo, decrease trigger points, and increase postural awareness to improve patient's ability to progress to PLOF and address remaining functional goals.  The manual therapy interventions were performed at a separate and distinct time from the therapeutic activities interventions . (see flow sheet as applicable)     Details if applicable:  DTM: mid thoracic paraspinals, bilat QL, glutes   36  46505 Therapeutic  General

## 2024-03-18 ASSESSMENT — ENCOUNTER SYMPTOMS
NAUSEA: 0
RHINORRHEA: 0
BACK PAIN: 1
COUGH: 1
VOMITING: 0
SHORTNESS OF BREATH: 0
DIARRHEA: 0

## 2024-03-18 NOTE — ASSESSMENT & PLAN NOTE
Monitored by specialist- no acute findings meriting change in the plan  Seeing physical therapy and Dr. Delcid

## 2024-03-22 ENCOUNTER — APPOINTMENT (OUTPATIENT)
Facility: HOSPITAL | Age: 29
End: 2024-03-22
Payer: COMMERCIAL

## 2024-03-26 DIAGNOSIS — E03.9 HYPOTHYROIDISM, UNSPECIFIED TYPE: Primary | ICD-10-CM

## 2024-03-26 DIAGNOSIS — Z13.0 SCREENING FOR DEFICIENCY ANEMIA: ICD-10-CM

## 2024-03-26 DIAGNOSIS — Z87.440 HISTORY OF UTI: ICD-10-CM

## 2024-03-26 DIAGNOSIS — Z83.3 FAMILY HISTORY OF DIABETES MELLITUS: ICD-10-CM

## 2024-03-26 DIAGNOSIS — E78.1 HYPERTRIGLYCERIDEMIA: ICD-10-CM

## 2024-03-29 ENCOUNTER — TELEPHONE (OUTPATIENT)
Facility: HOSPITAL | Age: 29
End: 2024-03-29

## 2024-04-01 ENCOUNTER — TELEPHONE (OUTPATIENT)
Facility: HOSPITAL | Age: 29
End: 2024-04-01

## 2024-04-06 DIAGNOSIS — F32.A DEPRESSION, UNSPECIFIED DEPRESSION TYPE: ICD-10-CM

## 2024-04-13 LAB
ALBUMIN SERPL-MCNC: 4.6 G/DL (ref 3.9–4.9)
ALBUMIN/GLOB SERPL: 1.7 {RATIO} (ref 1.2–2.2)
ALP SERPL-CCNC: 49 IU/L (ref 44–121)
ALT SERPL-CCNC: 43 IU/L (ref 0–32)
APPEARANCE UR: ABNORMAL
AST SERPL-CCNC: 34 IU/L (ref 0–40)
BILIRUB SERPL-MCNC: <0.2 MG/DL (ref 0–1.2)
BILIRUB UR QL STRIP: NEGATIVE
BUN SERPL-MCNC: 13 MG/DL (ref 6–20)
BUN/CREAT SERPL: 16 (ref 9–23)
CALCIUM SERPL-MCNC: 10.3 MG/DL (ref 8.7–10.2)
CHLORIDE SERPL-SCNC: 104 MMOL/L (ref 96–106)
CHOLEST SERPL-MCNC: 152 MG/DL (ref 100–199)
CO2 SERPL-SCNC: 21 MMOL/L (ref 20–29)
COLOR UR: YELLOW
CREAT SERPL-MCNC: 0.81 MG/DL (ref 0.57–1)
EGFRCR SERPLBLD CKD-EPI 2021: 99 ML/MIN/1.73
ERYTHROCYTE [DISTWIDTH] IN BLOOD BY AUTOMATED COUNT: 12.1 % (ref 11.7–15.4)
GLOBULIN SER CALC-MCNC: 2.7 G/DL (ref 1.5–4.5)
GLUCOSE SERPL-MCNC: 97 MG/DL (ref 70–99)
GLUCOSE UR QL STRIP: NEGATIVE
HBA1C MFR BLD: 5.6 % (ref 4.8–5.6)
HCT VFR BLD AUTO: 40.5 % (ref 34–46.6)
HDLC SERPL-MCNC: 49 MG/DL
HGB BLD-MCNC: 13.5 G/DL (ref 11.1–15.9)
HGB UR QL STRIP: NEGATIVE
KETONES UR QL STRIP: NEGATIVE
LDLC SERPL CALC-MCNC: 82 MG/DL (ref 0–99)
LEUKOCYTE ESTERASE UR QL STRIP: ABNORMAL
MCH RBC QN AUTO: 30.1 PG (ref 26.6–33)
MCHC RBC AUTO-ENTMCNC: 33.3 G/DL (ref 31.5–35.7)
MCV RBC AUTO: 90 FL (ref 79–97)
NITRITE UR QL STRIP: NEGATIVE
PH UR STRIP: 5.5 [PH] (ref 5–7.5)
PLATELET # BLD AUTO: 320 X10E3/UL (ref 150–450)
POTASSIUM SERPL-SCNC: 4.4 MMOL/L (ref 3.5–5.2)
PROT SERPL-MCNC: 7.3 G/DL (ref 6–8.5)
PROT UR QL STRIP: NEGATIVE
RBC # BLD AUTO: 4.49 X10E6/UL (ref 3.77–5.28)
SODIUM SERPL-SCNC: 141 MMOL/L (ref 134–144)
SP GR UR STRIP: >=1.03 (ref 1–1.03)
TRIGL SERPL-MCNC: 120 MG/DL (ref 0–149)
TSH SERPL DL<=0.005 MIU/L-ACNC: 2 UIU/ML (ref 0.45–4.5)
UROBILINOGEN UR STRIP-MCNC: 0.2 MG/DL (ref 0.2–1)
VLDLC SERPL CALC-MCNC: 21 MG/DL (ref 5–40)
WBC # BLD AUTO: 7.3 X10E3/UL (ref 3.4–10.8)

## 2024-04-19 RX ORDER — ESCITALOPRAM OXALATE 10 MG/1
10 TABLET ORAL DAILY
Qty: 90 TABLET | Refills: 1 | Status: SHIPPED | OUTPATIENT
Start: 2024-04-19

## 2024-05-02 DIAGNOSIS — K76.0 FATTY LIVER: Primary | ICD-10-CM

## 2024-05-13 ENCOUNTER — TELEPHONE (OUTPATIENT)
Facility: CLINIC | Age: 29
End: 2024-05-13

## 2024-05-13 NOTE — TELEPHONE ENCOUNTER
Tried calling patient to schedule her with NP Rama as a new patient, no answer; left voicemail for patient to call office back.

## 2024-06-03 ENCOUNTER — OFFICE VISIT (OUTPATIENT)
Facility: CLINIC | Age: 29
End: 2024-06-03
Payer: COMMERCIAL

## 2024-06-03 VITALS
SYSTOLIC BLOOD PRESSURE: 110 MMHG | HEART RATE: 84 BPM | WEIGHT: 199 LBS | DIASTOLIC BLOOD PRESSURE: 69 MMHG | OXYGEN SATURATION: 94 % | TEMPERATURE: 98.3 F | BODY MASS INDEX: 36.62 KG/M2 | HEIGHT: 62 IN | RESPIRATION RATE: 18 BRPM

## 2024-06-03 DIAGNOSIS — M54.16 LUMBAR RADICULOPATHY: ICD-10-CM

## 2024-06-03 DIAGNOSIS — R20.2 NUMBNESS AND TINGLING IN RIGHT HAND: ICD-10-CM

## 2024-06-03 DIAGNOSIS — Q76.49 TRANSITIONAL VERTEBRA: ICD-10-CM

## 2024-06-03 DIAGNOSIS — R20.0 NUMBNESS AND TINGLING IN RIGHT HAND: ICD-10-CM

## 2024-06-03 DIAGNOSIS — M54.2 NECK PAIN: ICD-10-CM

## 2024-06-03 DIAGNOSIS — E03.9 HYPOTHYROIDISM, UNSPECIFIED TYPE: Primary | ICD-10-CM

## 2024-06-03 DIAGNOSIS — E78.1 HYPERTRIGLYCERIDEMIA: ICD-10-CM

## 2024-06-03 DIAGNOSIS — E66.01 SEVERE OBESITY (BMI 35.0-39.9) WITH COMORBIDITY (HCC): ICD-10-CM

## 2024-06-03 DIAGNOSIS — F32.A DEPRESSION, UNSPECIFIED DEPRESSION TYPE: ICD-10-CM

## 2024-06-03 DIAGNOSIS — K76.0 FATTY LIVER: ICD-10-CM

## 2024-06-03 DIAGNOSIS — Z83.3 FAMILY HISTORY OF DIABETES MELLITUS: ICD-10-CM

## 2024-06-03 DIAGNOSIS — E83.52 HYPERCALCEMIA: ICD-10-CM

## 2024-06-03 DIAGNOSIS — N92.6 IRREGULAR MENSTRUATION, UNSPECIFIED: ICD-10-CM

## 2024-06-03 PROBLEM — M54.50 PAIN IN RIGHT LUMBAR REGION OF BACK: Status: RESOLVED | Noted: 2023-02-21 | Resolved: 2024-06-03

## 2024-06-03 PROBLEM — J40 BRONCHITIS: Status: RESOLVED | Noted: 2024-03-12 | Resolved: 2024-06-03

## 2024-06-03 PROBLEM — N39.0 URINARY TRACT INFECTION WITHOUT HEMATURIA: Status: RESOLVED | Noted: 2024-01-19 | Resolved: 2024-06-03

## 2024-06-03 PROBLEM — F41.9 ANXIETY DISORDER: Status: RESOLVED | Noted: 2021-10-12 | Resolved: 2024-06-03

## 2024-06-03 PROCEDURE — 99214 OFFICE O/P EST MOD 30 MIN: CPT | Performed by: NURSE PRACTITIONER

## 2024-06-03 RX ORDER — LEVONORGESTREL AND ETHINYL ESTRADIOL 0.15-0.03
1 KIT ORAL DAILY
Qty: 3 PACKET | Refills: 2 | Status: SHIPPED | OUTPATIENT
Start: 2024-06-03

## 2024-06-03 RX ORDER — ESCITALOPRAM OXALATE 10 MG/1
10 TABLET ORAL DAILY
Qty: 90 TABLET | Refills: 1 | Status: SHIPPED | OUTPATIENT
Start: 2024-06-03

## 2024-06-03 RX ORDER — FENOFIBRATE 48 MG/1
48 TABLET, COATED ORAL DAILY
Qty: 90 TABLET | Refills: 1 | Status: SHIPPED | OUTPATIENT
Start: 2024-06-03

## 2024-06-03 SDOH — ECONOMIC STABILITY: FOOD INSECURITY: WITHIN THE PAST 12 MONTHS, THE FOOD YOU BOUGHT JUST DIDN'T LAST AND YOU DIDN'T HAVE MONEY TO GET MORE.: NEVER TRUE

## 2024-06-03 SDOH — ECONOMIC STABILITY: FOOD INSECURITY: WITHIN THE PAST 12 MONTHS, YOU WORRIED THAT YOUR FOOD WOULD RUN OUT BEFORE YOU GOT MONEY TO BUY MORE.: NEVER TRUE

## 2024-06-03 SDOH — ECONOMIC STABILITY: INCOME INSECURITY: HOW HARD IS IT FOR YOU TO PAY FOR THE VERY BASICS LIKE FOOD, HOUSING, MEDICAL CARE, AND HEATING?: NOT HARD AT ALL

## 2024-06-03 ASSESSMENT — PATIENT HEALTH QUESTIONNAIRE - PHQ9
10. IF YOU CHECKED OFF ANY PROBLEMS, HOW DIFFICULT HAVE THESE PROBLEMS MADE IT FOR YOU TO DO YOUR WORK, TAKE CARE OF THINGS AT HOME, OR GET ALONG WITH OTHER PEOPLE: NOT DIFFICULT AT ALL
SUM OF ALL RESPONSES TO PHQ9 QUESTIONS 1 & 2: 0
5. POOR APPETITE OR OVEREATING: NOT AT ALL
1. LITTLE INTEREST OR PLEASURE IN DOING THINGS: NOT AT ALL
4. FEELING TIRED OR HAVING LITTLE ENERGY: NOT AT ALL
SUM OF ALL RESPONSES TO PHQ QUESTIONS 1-9: 0
8. MOVING OR SPEAKING SO SLOWLY THAT OTHER PEOPLE COULD HAVE NOTICED. OR THE OPPOSITE, BEING SO FIGETY OR RESTLESS THAT YOU HAVE BEEN MOVING AROUND A LOT MORE THAN USUAL: NOT AT ALL
SUM OF ALL RESPONSES TO PHQ QUESTIONS 1-9: 0
2. FEELING DOWN, DEPRESSED OR HOPELESS: NOT AT ALL
3. TROUBLE FALLING OR STAYING ASLEEP: NOT AT ALL
7. TROUBLE CONCENTRATING ON THINGS, SUCH AS READING THE NEWSPAPER OR WATCHING TELEVISION: NOT AT ALL
9. THOUGHTS THAT YOU WOULD BE BETTER OFF DEAD, OR OF HURTING YOURSELF: NOT AT ALL
SUM OF ALL RESPONSES TO PHQ QUESTIONS 1-9: 0
SUM OF ALL RESPONSES TO PHQ QUESTIONS 1-9: 0

## 2024-06-03 NOTE — PROGRESS NOTES
Room 16     Francisco Ross had concerns including Follow-up. for today's visit .     When asked if patient has any concerns she would like to address with HARIKA Fuentes patient states to discuss lab work and my right arm is numb and it has been numb since 12/2023 . HARIKA fuentes has been notified  of patient concerns .    Did patient bring someone? No    Did the patient have DME equipment? No    Did you take your medication today? Patient states I did not       1. \"Have you been to the ER, urgent care clinic since your last visit?  Hospitalized since your last visit?\"  Patient states I went to [facility] on [address] due to having strep     2. \"Have you seen or consulted any other health care providers outside of the [facility] since your last visit?\" No     3. For patients aged 45-75: Has the patient had a colonoscopy / FIT/ Cologuard? N/A      If the patient is female:    4. For patients aged 40-74: Has the patient had a mammogram within the past 2 years? N/A      5. For patients aged 21-65: Has the patient had a pap smear? {Cancer Care Gap present? N/A             6/3/2024     3:36 PM   PHQ-9    Little interest or pleasure in doing things 0   Feeling down, depressed, or hopeless 0   Trouble falling or staying asleep, or sleeping too much 0   Feeling tired or having little energy 0   Poor appetite or overeating 0   Feeling bad about yourself - or that you are a failure or have let yourself or your family down 0   Trouble concentrating on things, such as reading the newspaper or watching television 0   Moving or speaking so slowly that other people could have noticed. Or the opposite - being so fidgety or restless that you have been moving around a lot more than usual 0   Thoughts that you would be better off dead, or of hurting yourself in some way 0   PHQ-2 Score 0   PHQ-9 Total Score 0   If you checked off any problems, how difficult have these problems made it for you to do your work, take

## 2024-06-03 NOTE — PROGRESS NOTES
[address] [place] [id number]               [phone]      Francisco Ross is a 29 y.o. female and presents with Follow-up       Assessment/Plan:    1. Hypothyroidism, unspecified type  Assessment & Plan:   Well-controlled, continue current medications and medication adherence emphasized  2. Family history of diabetes mellitus  3. Neck pain  4. Depression, unspecified depression type  -     escitalopram (LEXAPRO) 10 MG tablet; Take 1 tablet by mouth daily, Disp-90 tablet, R-1Normal  5. Hypertriglyceridemia  Assessment & Plan:   Well-controlled, continue current medications, medication adherence emphasized, and lifestyle modifications recommended  Recent triglycerides 120 on tricor  Orders:  -     fenofibrate (TRICOR) 48 MG tablet; Take 1 tablet by mouth daily, Disp-90 tablet, R-1Normal  6. Hypercalcemia  Assessment & Plan:    Check PTH and ionized calcium  Orders:  -     Calcium Ionized Serum; Future  -     PTH, Intact; Future  7. Severe obesity (BMI 35.0-39.9) with comorbidity (HCC)  8. Transitional vertebra  Assessment & Plan:   Monitored by specialist- no acute findings meriting change in the plan  9. Numbness and tingling in right hand  10. Lumbar radiculopathy  Assessment & Plan:   Monitored by specialist- no acute findings meriting change in the plan  Finished PT and seeing Dr. Delcid  11. Fatty liver  Assessment & Plan:    Advised to lower fat in diet.  12. Irregular menstruation, unspecified  Comments:  on oral contraceptives  Assessment & Plan:   Well-controlled, continue current medications and medication adherence emphasized  Taking oral contraceptives for regularity.  Orders:  -     levonorgestrel-ethinyl estradiol (NORDETTE) 0.15-30 MG-MCG per tablet; Take 1 tablet by mouth daily, Disp-3 packet, R-2Normal         Follow up and disposition:   Return in about 4 months (around 10/3/2024).      Subjective:    Labs obtained prior to visit? Yes  Reviewed with

## 2024-06-14 PROBLEM — E78.00 HYPERCHOLESTEROLEMIA: Status: RESOLVED | Noted: 2021-10-12 | Resolved: 2024-06-14

## 2024-06-14 NOTE — ASSESSMENT & PLAN NOTE
Well-controlled, continue current medications and medication adherence emphasized  Taking oral contraceptives for regularity.

## 2024-06-14 NOTE — ASSESSMENT & PLAN NOTE
Monitored by specialist- no acute findings meriting change in the plan  Finished PT and Dr. Delcid

## 2024-06-14 NOTE — ASSESSMENT & PLAN NOTE
Well-controlled, continue current medications, medication adherence emphasized, and lifestyle modifications recommended  Recent triglycerides 120 on tricor

## 2024-11-22 LAB — PTH-INTACT SERPL-MCNC: 17 PG/ML (ref 15–65)

## 2024-11-25 ENCOUNTER — OFFICE VISIT (OUTPATIENT)
Facility: CLINIC | Age: 29
End: 2024-11-25
Payer: COMMERCIAL

## 2024-11-25 VITALS
TEMPERATURE: 98 F | WEIGHT: 200 LBS | OXYGEN SATURATION: 96 % | RESPIRATION RATE: 18 BRPM | DIASTOLIC BLOOD PRESSURE: 76 MMHG | HEART RATE: 69 BPM | HEIGHT: 62 IN | SYSTOLIC BLOOD PRESSURE: 113 MMHG | BODY MASS INDEX: 36.8 KG/M2

## 2024-11-25 DIAGNOSIS — H10.13 ALLERGIC CONJUNCTIVITIS OF BOTH EYES: ICD-10-CM

## 2024-11-25 DIAGNOSIS — J30.2 SEASONAL ALLERGIES: ICD-10-CM

## 2024-11-25 DIAGNOSIS — F32.A DEPRESSION, UNSPECIFIED DEPRESSION TYPE: ICD-10-CM

## 2024-11-25 DIAGNOSIS — F40.10 SOCIAL PHOBIA: ICD-10-CM

## 2024-11-25 DIAGNOSIS — E78.1 HYPERTRIGLYCERIDEMIA: ICD-10-CM

## 2024-11-25 DIAGNOSIS — R63.5 WEIGHT GAIN: ICD-10-CM

## 2024-11-25 DIAGNOSIS — E03.9 HYPOTHYROIDISM, UNSPECIFIED TYPE: Primary | ICD-10-CM

## 2024-11-25 PROBLEM — R20.2 NUMBNESS AND TINGLING IN RIGHT HAND: Status: RESOLVED | Noted: 2024-06-03 | Resolved: 2024-11-25

## 2024-11-25 PROBLEM — R20.0 NUMBNESS AND TINGLING IN RIGHT HAND: Status: RESOLVED | Noted: 2024-06-03 | Resolved: 2024-11-25

## 2024-11-25 PROCEDURE — 99213 OFFICE O/P EST LOW 20 MIN: CPT | Performed by: NURSE PRACTITIONER

## 2024-11-25 RX ORDER — FEXOFENADINE HCL 180 MG/1
TABLET ORAL
COMMUNITY
Start: 2024-11-11

## 2024-11-25 RX ORDER — FLUTICASONE PROPIONATE 50 MCG
2 SPRAY, SUSPENSION (ML) NASAL DAILY
Qty: 48 G | Refills: 1 | Status: SHIPPED | OUTPATIENT
Start: 2024-11-25

## 2024-11-25 RX ORDER — ESCITALOPRAM OXALATE 10 MG/1
10 TABLET ORAL DAILY
Qty: 90 TABLET | Refills: 1 | Status: SHIPPED | OUTPATIENT
Start: 2024-11-25

## 2024-11-25 RX ORDER — KETOTIFEN FUMARATE 0.35 MG/ML
1 SOLUTION/ DROPS OPHTHALMIC 2 TIMES DAILY
Qty: 5 ML | Refills: 0 | Status: SHIPPED | OUTPATIENT
Start: 2024-11-25 | End: 2025-01-14

## 2024-11-25 ASSESSMENT — PATIENT HEALTH QUESTIONNAIRE - PHQ9
SUM OF ALL RESPONSES TO PHQ QUESTIONS 1-9: 1
SUM OF ALL RESPONSES TO PHQ QUESTIONS 1-9: 1
2. FEELING DOWN, DEPRESSED OR HOPELESS: SEVERAL DAYS
SUM OF ALL RESPONSES TO PHQ QUESTIONS 1-9: 1
SUM OF ALL RESPONSES TO PHQ9 QUESTIONS 1 & 2: 1
SUM OF ALL RESPONSES TO PHQ QUESTIONS 1-9: 1
1. LITTLE INTEREST OR PLEASURE IN DOING THINGS: NOT AT ALL

## 2024-11-25 NOTE — PROGRESS NOTES
Patient wants a referral to allergist and Psychiatrist. Patient will get her Flu shot at the pharmacy.     Francisco Ross presents today for   Chief Complaint   Patient presents with    Follow-up       Is someone accompanying this pt? No     Is the patient using any DME equipment during OV? No     Depression Screening:       No data to display                Learning Assessment:  Failed to redirect to the Timeline version of the My1login SmartLink.    Health Maintenance reviewed and discussed and ordered per Provider.    Health Maintenance Due   Topic Date Due    Hepatitis B vaccine (1 of 3 - 19+ 3-dose series) Never done    Flu vaccine (1) 08/01/2024    COVID-19 Vaccine (4 - 2023-24 season) 09/01/2024     \"Have you been to the ER, urgent care clinic since your last visit?  Hospitalized since your last visit?\"    Yes allergic reaction Urgent care November     “Have you seen or consulted any other health care providers outside our system since your last visit?”    No                          
Problem List   Diagnosis    Hypothyroidism    Depression    Severe obesity (BMI 35.0-39.9) with comorbidity    Hypertriglyceridemia    Family history of diabetes mellitus    Transitional vertebra    Lumbar radiculopathy    Irregular menstruation, unspecified    Fatty liver    Hypercalcemia    Social phobia    Weight gain    Allergic conjunctivitis of both eyes    Seasonal allergies       The PSH, FH were reviewed.      SH:  Social History     Tobacco Use    Smoking status: Never    Smokeless tobacco: Never   Substance Use Topics    Alcohol use: Yes    Drug use: Not Currently       Medications/Allergies:  Current Outpatient Medications on File Prior to Visit   Medication Sig Dispense Refill    fexofenadine (ALLEGRA) 180 MG tablet TAKE 1 TABLET BY MOUTH EVERY DAY FOR ALLERGY SYMPTOMS      levonorgestrel-ethinyl estradiol (NORDETTE) 0.15-30 MG-MCG per tablet Take 1 tablet by mouth daily 3 packet 2    fenofibrate (TRICOR) 48 MG tablet Take 1 tablet by mouth daily (Patient taking differently: Take by mouth daily) 90 tablet 1    levothyroxine (SYNTHROID) 100 MCG tablet Take 1 tablet by mouth every morning (before breakfast) 90 tablet 2    Omega-3 Fatty Acids (FISH OIL PO) Take by mouth      Cholecalciferol (VITAMIN D3 PO) Take by mouth       No current facility-administered medications on file prior to visit.        No Known Allergies    Objective:  /76   Pulse 69   Temp 98 °F (36.7 °C) (Temporal)   Resp 18   Ht 1.575 m (5' 2\")   Wt 90.7 kg (200 lb)   SpO2 96%   BMI 36.58 kg/m²  Body mass index is 36.58 kg/m².    Physical assessment  Physical Exam  Constitutional:       General: She is not in acute distress.     Appearance: She is obese. She is not toxic-appearing.   Eyes:      General:         Right eye: Discharge present.         Left eye: Discharge present.     Conjunctiva/sclera:      Right eye: Right conjunctiva is injected.      Left eye: Left conjunctiva is injected.   Cardiovascular:      Rate and

## 2024-11-30 ASSESSMENT — ENCOUNTER SYMPTOMS
EYE REDNESS: 1
EYE ITCHING: 1
EYE PAIN: 0
RHINORRHEA: 0
EYE DISCHARGE: 1
NAUSEA: 0
DIARRHEA: 0
VOMITING: 0
COUGH: 0
SHORTNESS OF BREATH: 0
BACK PAIN: 0

## 2025-01-21 ENCOUNTER — OFFICE VISIT (OUTPATIENT)
Facility: CLINIC | Age: 30
End: 2025-01-21
Payer: COMMERCIAL

## 2025-01-21 VITALS
BODY MASS INDEX: 38.05 KG/M2 | OXYGEN SATURATION: 97 % | TEMPERATURE: 98.6 F | HEIGHT: 62 IN | HEART RATE: 71 BPM | WEIGHT: 206.8 LBS | RESPIRATION RATE: 17 BRPM | DIASTOLIC BLOOD PRESSURE: 68 MMHG | SYSTOLIC BLOOD PRESSURE: 114 MMHG

## 2025-01-21 DIAGNOSIS — Z13.31 POSITIVE DEPRESSION SCREENING: ICD-10-CM

## 2025-01-21 DIAGNOSIS — F32.A DEPRESSION, UNSPECIFIED DEPRESSION TYPE: ICD-10-CM

## 2025-01-21 DIAGNOSIS — H10.13 ALLERGIC CONJUNCTIVITIS AND RHINITIS, BILATERAL: ICD-10-CM

## 2025-01-21 DIAGNOSIS — J30.9 ALLERGIC CONJUNCTIVITIS AND RHINITIS, BILATERAL: ICD-10-CM

## 2025-01-21 DIAGNOSIS — J30.2 SEASONAL ALLERGIES: Primary | ICD-10-CM

## 2025-01-21 DIAGNOSIS — N92.6 IRREGULAR MENSTRUATION, UNSPECIFIED: ICD-10-CM

## 2025-01-21 DIAGNOSIS — E03.9 HYPOTHYROIDISM, UNSPECIFIED TYPE: ICD-10-CM

## 2025-01-21 PROCEDURE — 99213 OFFICE O/P EST LOW 20 MIN: CPT | Performed by: NURSE PRACTITIONER

## 2025-01-21 RX ORDER — KETOTIFEN FUMARATE 0.35 MG/ML
1 SOLUTION/ DROPS OPHTHALMIC 2 TIMES DAILY
Qty: 1 ML | Refills: 0 | Status: SHIPPED | OUTPATIENT
Start: 2025-01-21 | End: 2025-01-31

## 2025-01-21 RX ORDER — FEXOFENADINE HCL 180 MG/1
180 TABLET ORAL DAILY
Qty: 90 TABLET | Refills: 1 | Status: SHIPPED | OUTPATIENT
Start: 2025-01-21

## 2025-01-21 SDOH — ECONOMIC STABILITY: FOOD INSECURITY: WITHIN THE PAST 12 MONTHS, THE FOOD YOU BOUGHT JUST DIDN'T LAST AND YOU DIDN'T HAVE MONEY TO GET MORE.: NEVER TRUE

## 2025-01-21 SDOH — ECONOMIC STABILITY: FOOD INSECURITY: WITHIN THE PAST 12 MONTHS, YOU WORRIED THAT YOUR FOOD WOULD RUN OUT BEFORE YOU GOT MONEY TO BUY MORE.: NEVER TRUE

## 2025-01-21 ASSESSMENT — PATIENT HEALTH QUESTIONNAIRE - PHQ9
9. THOUGHTS THAT YOU WOULD BE BETTER OFF DEAD, OR OF HURTING YOURSELF: NOT AT ALL
7. TROUBLE CONCENTRATING ON THINGS, SUCH AS READING THE NEWSPAPER OR WATCHING TELEVISION: NEARLY EVERY DAY
8. MOVING OR SPEAKING SO SLOWLY THAT OTHER PEOPLE COULD HAVE NOTICED. OR THE OPPOSITE, BEING SO FIGETY OR RESTLESS THAT YOU HAVE BEEN MOVING AROUND A LOT MORE THAN USUAL: NOT AT ALL
4. FEELING TIRED OR HAVING LITTLE ENERGY: NOT AT ALL
5. POOR APPETITE OR OVEREATING: MORE THAN HALF THE DAYS
3. TROUBLE FALLING OR STAYING ASLEEP: NEARLY EVERY DAY
SUM OF ALL RESPONSES TO PHQ9 QUESTIONS 1 & 2: 2
1. LITTLE INTEREST OR PLEASURE IN DOING THINGS: SEVERAL DAYS
SUM OF ALL RESPONSES TO PHQ QUESTIONS 1-9: 10
6. FEELING BAD ABOUT YOURSELF - OR THAT YOU ARE A FAILURE OR HAVE LET YOURSELF OR YOUR FAMILY DOWN: NOT AT ALL
2. FEELING DOWN, DEPRESSED OR HOPELESS: SEVERAL DAYS
10. IF YOU CHECKED OFF ANY PROBLEMS, HOW DIFFICULT HAVE THESE PROBLEMS MADE IT FOR YOU TO DO YOUR WORK, TAKE CARE OF THINGS AT HOME, OR GET ALONG WITH OTHER PEOPLE: SOMEWHAT DIFFICULT

## 2025-01-21 ASSESSMENT — ANXIETY QUESTIONNAIRES
1. FEELING NERVOUS, ANXIOUS, OR ON EDGE: SEVERAL DAYS
2. NOT BEING ABLE TO STOP OR CONTROL WORRYING: NEARLY EVERY DAY
3. WORRYING TOO MUCH ABOUT DIFFERENT THINGS: SEVERAL DAYS
GAD7 TOTAL SCORE: 8
IF YOU CHECKED OFF ANY PROBLEMS ON THIS QUESTIONNAIRE, HOW DIFFICULT HAVE THESE PROBLEMS MADE IT FOR YOU TO DO YOUR WORK, TAKE CARE OF THINGS AT HOME, OR GET ALONG WITH OTHER PEOPLE: SOMEWHAT DIFFICULT
6. BECOMING EASILY ANNOYED OR IRRITABLE: NEARLY EVERY DAY
4. TROUBLE RELAXING: NOT AT ALL
7. FEELING AFRAID AS IF SOMETHING AWFUL MIGHT HAPPEN: NOT AT ALL
5. BEING SO RESTLESS THAT IT IS HARD TO SIT STILL: NOT AT ALL

## 2025-01-21 NOTE — PROGRESS NOTES
PHQ-9 score today: (PHQ-9 Total Score: 10), additional evaluation and assessment performed, follow-up plan includes but not limited to: Medication management and Referral to /Specialist  for evaluation and management.   --will increase Lexapro from 10 to 20 mg daily and recheck in 1 month     Francisco Ross is a 29 y.o. female and presents with Follow-up and Allergies       Assessment/Plan:    1. Seasonal allergies  Assessment & Plan:       Orders:  -     fexofenadine (ALLEGRA) 180 MG tablet; Take 1 tablet by mouth daily, Disp-90 tablet, R-1Normal  2. Hypothyroidism, unspecified type  Assessment & Plan:   Chronic, at goal (stable), continue current treatment plan  3. Depression, unspecified depression type  4. Positive depression screening  Assessment & Plan:    Provided therapy handouts.   5. Allergic conjunctivitis and rhinitis, bilateral  Assessment & Plan:   Chronic, not at goal (unstable), changes made today: start zaditor eye drops.   Orders:  -     ketotifen fumarate (ZADITOR) 0.035 % ophthalmic solution; Place 1 drop into both eyes 2 times daily for 10 days, Disp-1 mL, R-0Normal  6. Irregular menstruation, unspecified         Follow up and disposition:   Return in about 4 weeks (around 2/18/2025).      Subjective:    Labs obtained prior to visit? No  Reviewed with patient? N/A    12/11-12/15  12/26-12/29     [facility] 01/07/2025      ROS:     Review of Systems   Constitutional:  Negative for appetite change, chills, diaphoresis, fatigue and fever.   HENT:  Negative for congestion, ear pain and rhinorrhea.    Eyes:  Positive for redness and itching. Negative for pain.   Respiratory:  Negative for cough and shortness of breath.    Cardiovascular:  Negative for chest pain.   Gastrointestinal:  Negative for diarrhea, nausea and vomiting.   Genitourinary:  Negative for dysuria and frequency.   Musculoskeletal:  Negative for back pain and neck pain.   Skin:  Negative for rash.   Allergic/Immunologic: Positive for

## 2025-01-23 PROBLEM — R63.5 WEIGHT GAIN: Status: RESOLVED | Noted: 2024-11-25 | Resolved: 2025-01-23

## 2025-01-23 PROBLEM — E83.52 HYPERCALCEMIA: Status: RESOLVED | Noted: 2024-06-03 | Resolved: 2025-01-23

## 2025-01-23 ASSESSMENT — ENCOUNTER SYMPTOMS
BACK PAIN: 0
EYE PAIN: 0
DIARRHEA: 0
RHINORRHEA: 0
COUGH: 0
VOMITING: 0
EYE ITCHING: 1
SHORTNESS OF BREATH: 0
EYE REDNESS: 1
NAUSEA: 0

## 2025-02-26 ENCOUNTER — OFFICE VISIT (OUTPATIENT)
Facility: CLINIC | Age: 30
End: 2025-02-26
Payer: COMMERCIAL

## 2025-02-26 VITALS
RESPIRATION RATE: 17 BRPM | BODY MASS INDEX: 37.98 KG/M2 | SYSTOLIC BLOOD PRESSURE: 108 MMHG | WEIGHT: 206.4 LBS | HEIGHT: 62 IN | OXYGEN SATURATION: 98 % | HEART RATE: 82 BPM | DIASTOLIC BLOOD PRESSURE: 74 MMHG | TEMPERATURE: 98.4 F

## 2025-02-26 DIAGNOSIS — F32.A DEPRESSION, UNSPECIFIED DEPRESSION TYPE: ICD-10-CM

## 2025-02-26 DIAGNOSIS — E03.9 HYPOTHYROIDISM, UNSPECIFIED TYPE: Primary | ICD-10-CM

## 2025-02-26 DIAGNOSIS — F40.10 SOCIAL PHOBIA: ICD-10-CM

## 2025-02-26 PROBLEM — Z13.31 POSITIVE DEPRESSION SCREENING: Status: RESOLVED | Noted: 2025-01-21 | Resolved: 2025-02-26

## 2025-02-26 PROBLEM — H10.13 ALLERGIC CONJUNCTIVITIS AND RHINITIS, BILATERAL: Status: RESOLVED | Noted: 2024-11-25 | Resolved: 2025-02-26

## 2025-02-26 PROBLEM — J30.9 ALLERGIC CONJUNCTIVITIS AND RHINITIS, BILATERAL: Status: RESOLVED | Noted: 2024-11-25 | Resolved: 2025-02-26

## 2025-02-26 PROBLEM — Q76.49 TRANSITIONAL VERTEBRA: Status: RESOLVED | Noted: 2023-02-28 | Resolved: 2025-02-26

## 2025-02-26 PROCEDURE — 99213 OFFICE O/P EST LOW 20 MIN: CPT | Performed by: NURSE PRACTITIONER

## 2025-02-26 RX ORDER — ESCITALOPRAM OXALATE 20 MG/1
20 TABLET ORAL DAILY
Qty: 90 TABLET | Refills: 1 | Status: SHIPPED | OUTPATIENT
Start: 2025-02-26

## 2025-02-26 SDOH — ECONOMIC STABILITY: FOOD INSECURITY: WITHIN THE PAST 12 MONTHS, THE FOOD YOU BOUGHT JUST DIDN'T LAST AND YOU DIDN'T HAVE MONEY TO GET MORE.: NEVER TRUE

## 2025-02-26 SDOH — ECONOMIC STABILITY: FOOD INSECURITY: WITHIN THE PAST 12 MONTHS, YOU WORRIED THAT YOUR FOOD WOULD RUN OUT BEFORE YOU GOT MONEY TO BUY MORE.: NEVER TRUE

## 2025-02-26 ASSESSMENT — PATIENT HEALTH QUESTIONNAIRE - PHQ9
6. FEELING BAD ABOUT YOURSELF - OR THAT YOU ARE A FAILURE OR HAVE LET YOURSELF OR YOUR FAMILY DOWN: NOT AT ALL
SUM OF ALL RESPONSES TO PHQ QUESTIONS 1-9: 1
9. THOUGHTS THAT YOU WOULD BE BETTER OFF DEAD, OR OF HURTING YOURSELF: NOT AT ALL
4. FEELING TIRED OR HAVING LITTLE ENERGY: NOT AT ALL
1. LITTLE INTEREST OR PLEASURE IN DOING THINGS: SEVERAL DAYS
8. MOVING OR SPEAKING SO SLOWLY THAT OTHER PEOPLE COULD HAVE NOTICED. OR THE OPPOSITE, BEING SO FIGETY OR RESTLESS THAT YOU HAVE BEEN MOVING AROUND A LOT MORE THAN USUAL: NOT AT ALL
3. TROUBLE FALLING OR STAYING ASLEEP: NOT AT ALL
7. TROUBLE CONCENTRATING ON THINGS, SUCH AS READING THE NEWSPAPER OR WATCHING TELEVISION: NOT AT ALL
SUM OF ALL RESPONSES TO PHQ9 QUESTIONS 1 & 2: 1
2. FEELING DOWN, DEPRESSED OR HOPELESS: NOT AT ALL
5. POOR APPETITE OR OVEREATING: NOT AT ALL
SUM OF ALL RESPONSES TO PHQ QUESTIONS 1-9: 1
10. IF YOU CHECKED OFF ANY PROBLEMS, HOW DIFFICULT HAVE THESE PROBLEMS MADE IT FOR YOU TO DO YOUR WORK, TAKE CARE OF THINGS AT HOME, OR GET ALONG WITH OTHER PEOPLE: NOT DIFFICULT AT ALL

## 2025-02-26 NOTE — PROGRESS NOTES
Francisco Ross presents today for   Chief Complaint   Patient presents with    Follow-up       Is someone accompanying this pt? No     Is the patient using any DME equipment during OV? No         2/26/2025     4:15 PM   PHQ-9    Little interest or pleasure in doing things 1   Feeling down, depressed, or hopeless 0   Trouble falling or staying asleep, or sleeping too much 0   Feeling tired or having little energy 0   Poor appetite or overeating 0   Feeling bad about yourself - or that you are a failure or have let yourself or your family down 0   Trouble concentrating on things, such as reading the newspaper or watching television 0   Moving or speaking so slowly that other people could have noticed. Or the opposite - being so fidgety or restless that you have been moving around a lot more than usual 0   Thoughts that you would be better off dead, or of hurting yourself in some way 0   PHQ-2 Score 1   PHQ-9 Total Score 1   If you checked off any problems, how difficult have these problems made it for you to do your work, take care of things at home, or get along with other people? 0        Who is the primary learner? Patient    What is the preferred language for health care of the primary learner? OTHER (COMMENT) jose   How does the primary learner prefer to learn new concepts? DEMONSTRATION    Answered By Patient    Relationship to Learner SELF         Health Maintenance reviewed and discussed and ordered per Provider.      \"Have you been to the ER, urgent care clinic since your last visit?  Hospitalized since your last visit?\"    No     “Have you seen or consulted any other health care providers outside our system since your last visit?”    Psychiatrist

## 2025-02-27 ASSESSMENT — ENCOUNTER SYMPTOMS
COUGH: 0
SHORTNESS OF BREATH: 0
DIARRHEA: 0
VOMITING: 0
BACK PAIN: 0
NAUSEA: 0
RHINORRHEA: 0

## 2025-02-27 NOTE — ASSESSMENT & PLAN NOTE
Chronic, not at goal (unstable), continue current treatment plan, medication adherence emphasized, and lifestyle modifications recommended  Seeing psychiatry

## 2025-02-27 NOTE — ASSESSMENT & PLAN NOTE
Chronic, not at goal (unstable), continue current treatment plan. Lexapro increased dosage and addition of vrylar helping.

## 2025-02-27 NOTE — PROGRESS NOTES
[address] [place] [address]               [phone]      Francisco Ross is a 29 y.o. female and presents with Follow-up       Assessment/Plan:    1. Hypothyroidism, unspecified type  Assessment & Plan:   Chronic, at goal (stable), continue current treatment plan  2. Depression, unspecified depression type  Assessment & Plan:   Chronic, not at goal (unstable), continue current treatment plan, medication adherence emphasized, and lifestyle modifications recommended  Seeing psychiatry   Orders:  -     escitalopram (LEXAPRO) 20 MG tablet; Take 1 tablet by mouth daily, Disp-90 tablet, R-1Normal  3. Social phobia  Assessment & Plan:   Chronic, not at goal (unstable), continue current treatment plan. Lexapro increased dosage and addition of vrylar helping.          Follow up and disposition:   Return in about 6 months (around 8/26/2025).      Subjective:    Labs obtained prior to visit? No  Reviewed with patient? N/A    Patient seeing psychiatrist through [facility]  --recently started on Vrylar with mood improvement      ROS:     Review of Systems   Constitutional:  Positive for fatigue.   HENT:  Positive for congestion. Negative for rhinorrhea.    Respiratory:  Negative for cough and shortness of breath.    Cardiovascular:  Negative for chest pain.   Gastrointestinal:  Negative for diarrhea, nausea and vomiting.   Genitourinary:  Negative for dysuria and frequency.   Musculoskeletal:  Negative for back pain and neck pain.   Skin:  Negative for rash.   Allergic/Immunologic: Positive for environmental allergies.   Neurological:  Negative for light-headedness and numbness.   Psychiatric/Behavioral:  Positive for dysphoric mood. Negative for suicidal ideas. The patient is not nervous/anxious.          The problem list was updated as a part of today's visit.  Patient Active Problem List   Diagnosis    Hypothyroidism    Depression    Severe obesity (BMI 35.0-39.9) with

## 2025-03-18 DIAGNOSIS — E78.1 HYPERTRIGLYCERIDEMIA: ICD-10-CM

## 2025-03-18 RX ORDER — ATOMOXETINE 25 MG/1
CAPSULE ORAL
COMMUNITY
Start: 2025-01-31

## 2025-03-18 RX ORDER — FENOFIBRATE 48 MG/1
48 TABLET, COATED ORAL DAILY
Qty: 90 TABLET | Refills: 1 | Status: SHIPPED | OUTPATIENT
Start: 2025-03-18

## 2025-03-18 RX ORDER — ATOMOXETINE 40 MG/1
CAPSULE ORAL
COMMUNITY
Start: 2025-03-17

## 2025-03-31 ENCOUNTER — OFFICE VISIT (OUTPATIENT)
Facility: CLINIC | Age: 30
End: 2025-03-31
Payer: COMMERCIAL

## 2025-03-31 VITALS
SYSTOLIC BLOOD PRESSURE: 114 MMHG | OXYGEN SATURATION: 99 % | DIASTOLIC BLOOD PRESSURE: 77 MMHG | WEIGHT: 209.6 LBS | BODY MASS INDEX: 38.57 KG/M2 | HEART RATE: 70 BPM | HEIGHT: 62 IN | TEMPERATURE: 97.8 F | RESPIRATION RATE: 20 BRPM

## 2025-03-31 DIAGNOSIS — E03.9 HYPOTHYROIDISM, UNSPECIFIED TYPE: ICD-10-CM

## 2025-03-31 DIAGNOSIS — E66.01 SEVERE OBESITY (BMI 35.0-39.9) WITH COMORBIDITY: ICD-10-CM

## 2025-03-31 DIAGNOSIS — R63.5 WEIGHT GAIN: ICD-10-CM

## 2025-03-31 DIAGNOSIS — Z13.31 POSITIVE DEPRESSION SCREENING: Primary | ICD-10-CM

## 2025-03-31 DIAGNOSIS — F50.811 MODERATE BINGE-EATING DISORDER: ICD-10-CM

## 2025-03-31 DIAGNOSIS — F32.A DEPRESSION, UNSPECIFIED DEPRESSION TYPE: ICD-10-CM

## 2025-03-31 PROCEDURE — 99213 OFFICE O/P EST LOW 20 MIN: CPT | Performed by: NURSE PRACTITIONER

## 2025-03-31 SDOH — ECONOMIC STABILITY: FOOD INSECURITY: WITHIN THE PAST 12 MONTHS, YOU WORRIED THAT YOUR FOOD WOULD RUN OUT BEFORE YOU GOT MONEY TO BUY MORE.: NEVER TRUE

## 2025-03-31 SDOH — ECONOMIC STABILITY: FOOD INSECURITY: WITHIN THE PAST 12 MONTHS, THE FOOD YOU BOUGHT JUST DIDN'T LAST AND YOU DIDN'T HAVE MONEY TO GET MORE.: NEVER TRUE

## 2025-03-31 ASSESSMENT — ANXIETY QUESTIONNAIRES
7. FEELING AFRAID AS IF SOMETHING AWFUL MIGHT HAPPEN: SEVERAL DAYS
GAD7 TOTAL SCORE: 17
4. TROUBLE RELAXING: NEARLY EVERY DAY
IF YOU CHECKED OFF ANY PROBLEMS ON THIS QUESTIONNAIRE, HOW DIFFICULT HAVE THESE PROBLEMS MADE IT FOR YOU TO DO YOUR WORK, TAKE CARE OF THINGS AT HOME, OR GET ALONG WITH OTHER PEOPLE: SOMEWHAT DIFFICULT
5. BEING SO RESTLESS THAT IT IS HARD TO SIT STILL: NEARLY EVERY DAY
2. NOT BEING ABLE TO STOP OR CONTROL WORRYING: SEVERAL DAYS
1. FEELING NERVOUS, ANXIOUS, OR ON EDGE: NEARLY EVERY DAY
6. BECOMING EASILY ANNOYED OR IRRITABLE: NEARLY EVERY DAY
3. WORRYING TOO MUCH ABOUT DIFFERENT THINGS: NEARLY EVERY DAY

## 2025-03-31 ASSESSMENT — PATIENT HEALTH QUESTIONNAIRE - PHQ9
6. FEELING BAD ABOUT YOURSELF - OR THAT YOU ARE A FAILURE OR HAVE LET YOURSELF OR YOUR FAMILY DOWN: NEARLY EVERY DAY
3. TROUBLE FALLING OR STAYING ASLEEP: NOT AT ALL
4. FEELING TIRED OR HAVING LITTLE ENERGY: SEVERAL DAYS
5. POOR APPETITE OR OVEREATING: MORE THAN HALF THE DAYS
9. THOUGHTS THAT YOU WOULD BE BETTER OFF DEAD, OR OF HURTING YOURSELF: NOT AT ALL
8. MOVING OR SPEAKING SO SLOWLY THAT OTHER PEOPLE COULD HAVE NOTICED. OR THE OPPOSITE, BEING SO FIGETY OR RESTLESS THAT YOU HAVE BEEN MOVING AROUND A LOT MORE THAN USUAL: NEARLY EVERY DAY
2. FEELING DOWN, DEPRESSED OR HOPELESS: SEVERAL DAYS
SUM OF ALL RESPONSES TO PHQ QUESTIONS 1-9: 13
1. LITTLE INTEREST OR PLEASURE IN DOING THINGS: NOT AT ALL
SUM OF ALL RESPONSES TO PHQ QUESTIONS 1-9: 13
7. TROUBLE CONCENTRATING ON THINGS, SUCH AS READING THE NEWSPAPER OR WATCHING TELEVISION: NEARLY EVERY DAY
SUM OF ALL RESPONSES TO PHQ QUESTIONS 1-9: 13
10. IF YOU CHECKED OFF ANY PROBLEMS, HOW DIFFICULT HAVE THESE PROBLEMS MADE IT FOR YOU TO DO YOUR WORK, TAKE CARE OF THINGS AT HOME, OR GET ALONG WITH OTHER PEOPLE: SOMEWHAT DIFFICULT
SUM OF ALL RESPONSES TO PHQ QUESTIONS 1-9: 13

## 2025-03-31 ASSESSMENT — ENCOUNTER SYMPTOMS
SHORTNESS OF BREATH: 0
DIARRHEA: 0
NAUSEA: 0
VOMITING: 0
COUGH: 0
BACK PAIN: 0

## 2025-03-31 NOTE — ASSESSMENT & PLAN NOTE
Chronic, at goal (stable), continue current treatment plan  Recheck TSH today due to slight weight gain

## 2025-03-31 NOTE — ASSESSMENT & PLAN NOTE
Check cortisol level,  aldosterone and TSH. Patient may need AcTh stimulation test based on results.

## 2025-03-31 NOTE — PROGRESS NOTES
Room: 17  Provider: Alba Cunninghamnickie Ross is a 30 y.o. female (: 1995) presenting to address:    Chief Complaint   Patient presents with    Follow-up     Would like to get labs for cortisol and adrenal   Is there possibly another medications that I could take for my thyroid if the labs indicate I need a different medication       Vitals:    25 0925   BP: 114/77   Pulse: 70   Resp: 20   Temp: 97.8 °F (36.6 °C)   SpO2: 99%       \"Have you been to the ER, urgent care clinic since your last visit?  Hospitalized since your last visit?\"    NO    “Have you seen or consulted any other health care providers outside of [facility] since your last visit?”    NO      “Have you had a pap smear?”    YES - Where: 20[facility] to request most recent records if not in the chart    Date of last Cervical Cancer screen (HPV or PAP): 3/5/2024               3/31/2025     9:21 AM   PHQ-9    Little interest or pleasure in doing things 0   Feeling down, depressed, or hopeless 1   Trouble falling or staying asleep, or sleeping too much 0   Feeling tired or having little energy 1   Poor appetite or overeating 2   Feeling bad about yourself - or that you are a failure or have let yourself or your family down 3   Trouble concentrating on things, such as reading the newspaper or watching television 3   Moving or speaking so slowly that other people could have noticed. Or the opposite - being so fidgety or restless that you have been moving around a lot more than usual 3   Thoughts that you would be better off dead, or of hurting yourself in some way 0   PHQ-2 Score 1   PHQ-9 Total Score 13   If you checked off any problems, how difficult have these problems made it for you to do your work, take care of things at home, or get along with other people? 1            3/31/2025     9:22 AM 2025     9:21 AM   MELECIO-7 SCREENING   Feeling nervous, anxious, or on edge Nearly every day Several

## 2025-03-31 NOTE — PROGRESS NOTES
PHQ-9 score today: (PHQ-9 Total Score: 13), additional evaluation and assessment performed, follow-up plan includes but not limited to: Medication management and Referral to BH/Specialist  for evaluation and management.   --patient seeing therapist/psychiatrist regularly; denies SI/HI      Francisco Ross is a 30 y.o. female and presents with Follow-up (Would like to get labs for cortisol and adrenal /Is there possibly another medications that I could take for my thyroid if the labs indicate I need a different medication)       Assessment/Plan:    1. Positive depression screening  2. Weight gain  Assessment & Plan:    Check cortisol level,  aldosterone and TSH. Patient may need AcTh stimulation test based on results.  Orders:  -     TSH; Future  -     Aldosterone; Future  3. Moderate binge-eating disorder  Assessment & Plan:   Chronic, not at goal (unstable), seeing therapist  4. Depression, unspecified depression type  5. Hypothyroidism, unspecified type  Assessment & Plan:   Chronic, at goal (stable), continue current treatment plan  Recheck TSH today due to slight weight gain  6. Severe obesity (BMI 35.0-39.9) with comorbidity         Follow up and disposition:   No follow-ups on file.      Subjective:    Labs obtained prior to visit? No  Reviewed with patient? N/A    [facility] 01/2025    --scheduled for PAP 04/07/2025    Patient requesting cortisol level and adrenal labs as she has been watching different [employer] videos about weight gain  --patient has binge eating disorder and seeing psych/therapist     ROS:     Review of Systems   Constitutional:  Positive for fatigue.        Weight gain   Respiratory:  Negative for cough and shortness of breath.    Cardiovascular:  Negative for chest pain.   Gastrointestinal:  Negative for diarrhea, nausea and vomiting.   Genitourinary:  Negative for dysuria and frequency.   Musculoskeletal:  Negative for back pain and neck pain.   Skin:  Negative for rash.

## 2025-04-01 LAB — TSH SERPL DL<=0.005 MIU/L-ACNC: 3.19 UIU/ML (ref 0.45–4.5)

## 2025-04-02 ENCOUNTER — RESULTS FOLLOW-UP (OUTPATIENT)
Facility: CLINIC | Age: 30
End: 2025-04-02

## 2025-04-02 LAB — ALDOST SERPL-MCNC: 3.2 NG/DL (ref 0–30)

## 2025-07-28 DIAGNOSIS — E03.9 HYPOTHYROIDISM, UNSPECIFIED TYPE: ICD-10-CM

## 2025-07-29 RX ORDER — LEVOTHYROXINE SODIUM 100 UG/1
100 TABLET ORAL
Qty: 90 TABLET | Refills: 2 | Status: SHIPPED | OUTPATIENT
Start: 2025-07-29

## 2025-08-29 DIAGNOSIS — F32.A DEPRESSION, UNSPECIFIED DEPRESSION TYPE: ICD-10-CM

## 2025-08-29 RX ORDER — ESCITALOPRAM OXALATE 20 MG/1
20 TABLET ORAL DAILY
Qty: 30 TABLET | Refills: 0 | Status: SHIPPED | OUTPATIENT
Start: 2025-08-29